# Patient Record
Sex: MALE | Race: OTHER | HISPANIC OR LATINO | Employment: OTHER | URBAN - METROPOLITAN AREA
[De-identification: names, ages, dates, MRNs, and addresses within clinical notes are randomized per-mention and may not be internally consistent; named-entity substitution may affect disease eponyms.]

---

## 2024-12-21 ENCOUNTER — HOSPITAL ENCOUNTER (EMERGENCY)
Facility: HOSPITAL | Age: 74
Discharge: HOME | End: 2024-12-21
Attending: EMERGENCY MEDICINE
Payer: MEDICARE

## 2024-12-21 ENCOUNTER — APPOINTMENT (OUTPATIENT)
Dept: RADIOLOGY | Facility: HOSPITAL | Age: 74
End: 2024-12-21
Payer: MEDICARE

## 2024-12-21 VITALS
SYSTOLIC BLOOD PRESSURE: 139 MMHG | HEIGHT: 65 IN | WEIGHT: 189 LBS | HEART RATE: 80 BPM | TEMPERATURE: 98.2 F | OXYGEN SATURATION: 99 % | DIASTOLIC BLOOD PRESSURE: 78 MMHG | BODY MASS INDEX: 31.49 KG/M2 | RESPIRATION RATE: 18 BRPM

## 2024-12-21 DIAGNOSIS — S89.92XA INJURY OF LEFT KNEE, INITIAL ENCOUNTER: ICD-10-CM

## 2024-12-21 DIAGNOSIS — B02.29 POST HERPETIC NEURALGIA: Primary | ICD-10-CM

## 2024-12-21 PROCEDURE — 2500000005 HC RX 250 GENERAL PHARMACY W/O HCPCS

## 2024-12-21 PROCEDURE — 73552 X-RAY EXAM OF FEMUR 2/>: CPT | Mod: LEFT SIDE | Performed by: RADIOLOGY

## 2024-12-21 PROCEDURE — 99283 EMERGENCY DEPT VISIT LOW MDM: CPT | Performed by: EMERGENCY MEDICINE

## 2024-12-21 PROCEDURE — 99284 EMERGENCY DEPT VISIT MOD MDM: CPT | Performed by: EMERGENCY MEDICINE

## 2024-12-21 PROCEDURE — 2500000001 HC RX 250 WO HCPCS SELF ADMINISTERED DRUGS (ALT 637 FOR MEDICARE OP): Performed by: EMERGENCY MEDICINE

## 2024-12-21 PROCEDURE — 73552 X-RAY EXAM OF FEMUR 2/>: CPT | Mod: LT

## 2024-12-21 RX ORDER — LIDOCAINE 560 MG/1
1 PATCH PERCUTANEOUS; TOPICAL; TRANSDERMAL ONCE
Status: DISCONTINUED | OUTPATIENT
Start: 2024-12-21 | End: 2024-12-21 | Stop reason: HOSPADM

## 2024-12-21 RX ORDER — GABAPENTIN 300 MG/1
300 CAPSULE ORAL 3 TIMES DAILY
Qty: 42 CAPSULE | Refills: 0 | Status: SHIPPED | OUTPATIENT
Start: 2024-12-21 | End: 2025-01-04

## 2024-12-21 RX ORDER — IBUPROFEN 600 MG/1
600 TABLET ORAL ONCE
Status: COMPLETED | OUTPATIENT
Start: 2024-12-21 | End: 2024-12-21

## 2024-12-21 RX ORDER — GABAPENTIN 100 MG/1
200 CAPSULE ORAL ONCE
Status: COMPLETED | OUTPATIENT
Start: 2024-12-21 | End: 2024-12-21

## 2024-12-21 RX ORDER — IBUPROFEN 600 MG/1
600 TABLET ORAL EVERY 6 HOURS PRN
Qty: 21 TABLET | Refills: 0 | Status: SHIPPED | OUTPATIENT
Start: 2024-12-21 | End: 2024-12-28

## 2024-12-21 RX ORDER — ACETAMINOPHEN 325 MG/1
650 TABLET ORAL ONCE
Status: COMPLETED | OUTPATIENT
Start: 2024-12-21 | End: 2024-12-21

## 2024-12-21 RX ADMIN — LIDOCAINE 4% 1 PATCH: 40 PATCH TOPICAL at 17:45

## 2024-12-21 RX ADMIN — IBUPROFEN 600 MG: 600 TABLET, FILM COATED ORAL at 17:45

## 2024-12-21 RX ADMIN — ACETAMINOPHEN 650 MG: 325 TABLET ORAL at 17:45

## 2024-12-21 RX ADMIN — GABAPENTIN 200 MG: 100 CAPSULE ORAL at 17:45

## 2024-12-21 ASSESSMENT — COLUMBIA-SUICIDE SEVERITY RATING SCALE - C-SSRS
1. IN THE PAST MONTH, HAVE YOU WISHED YOU WERE DEAD OR WISHED YOU COULD GO TO SLEEP AND NOT WAKE UP?: NO
2. HAVE YOU ACTUALLY HAD ANY THOUGHTS OF KILLING YOURSELF?: NO
6. HAVE YOU EVER DONE ANYTHING, STARTED TO DO ANYTHING, OR PREPARED TO DO ANYTHING TO END YOUR LIFE?: NO

## 2024-12-21 ASSESSMENT — LIFESTYLE VARIABLES
HAVE YOU EVER FELT YOU SHOULD CUT DOWN ON YOUR DRINKING: NO
EVER HAD A DRINK FIRST THING IN THE MORNING TO STEADY YOUR NERVES TO GET RID OF A HANGOVER: NO
HAVE PEOPLE ANNOYED YOU BY CRITICIZING YOUR DRINKING: NO
EVER FELT BAD OR GUILTY ABOUT YOUR DRINKING: NO
TOTAL SCORE: 0

## 2024-12-21 ASSESSMENT — PAIN SCALES - GENERAL
PAINLEVEL_OUTOF10: 7
PAINLEVEL_OUTOF10: 8

## 2024-12-21 ASSESSMENT — PAIN DESCRIPTION - ORIENTATION: ORIENTATION: LEFT

## 2024-12-21 ASSESSMENT — PAIN DESCRIPTION - PAIN TYPE: TYPE: ACUTE PAIN

## 2024-12-21 ASSESSMENT — PAIN - FUNCTIONAL ASSESSMENT: PAIN_FUNCTIONAL_ASSESSMENT: 0-10

## 2024-12-21 ASSESSMENT — PAIN DESCRIPTION - LOCATION: LOCATION: LEG

## 2024-12-21 NOTE — ED PROVIDER NOTES
EMERGENCY DEPARTMENT ENCOUNTER      Pt Name: Blaise Pope  MRN: [id number]  Birthdate 1950  Date of evaluation: 12/21/2024  Provider: Blanco Marie DO    CHIEF COMPLAINT       Chief Complaint   Patient presents with    Leg Pain     Pt presents to the ED with left thigh pain - pt states it feels as if someone is squeezing his thigh, pt rates pain 8/10. Pt states difficulty ambulating due to the pain x 3 days. Pt had shingles on that thigh 6 weeks ago but has since resolved. Pt states three days ago he fell while ambulating due to the pain - denies hitting his head.     HISTORY OF PRESENT ILLNESS    HPI  73-year-old male presents emergency department chief complaint of leg pain.  Patient claims that he had shingles approximately 6 weeks ago that is since resolved however he has had diffuse pain in the L4-L5 distribution of his left thigh.  He claims having difficulty walking with weakness extending into his knee.  He indicates that he feels as if his knee is giving out.  He has had several falls but this weakness of his left lower extremity.  He has not hit his head he is not on blood thinners.  Nursing Notes were reviewed.    PAST MEDICAL HISTORY   History reviewed. No pertinent past medical history.      SURGICAL HISTORY     History reviewed. No pertinent surgical history.      CURRENT MEDICATIONS       Discharge Medication List as of 12/21/2024  6:20 PM          ALLERGIES     Amoxicillin-pot clavulanate, Oxycodone-acetaminophen, Ranolazine, and Cephalexin    FAMILY HISTORY     No family history on file.       SOCIAL HISTORY       Social History     Socioeconomic History    Marital status:      Social Drivers of Health     Physical Activity: Insufficiently Active (5/20/2024)    [facility]    Exercise Vital Sign     Days of Exercise per Week: 2 days     Minutes of Exercise per Session: 30 min       SCREENINGS                        PHYSICAL EXAM    (up to 7  for level 4, 8 or more for level 5)     ED Triage Vitals [12/21/24 1639]   Temperature Heart Rate Respirations BP   36.8 °C (98.2 °F) 89 18 145/81      Pulse Ox Temp Source Heart Rate Source Patient Position   99 % Temporal Monitor Sitting      BP Location FiO2 (%)     Right arm --       Physical Exam  Constitutional:       Appearance: Normal appearance.   HENT:      Head: Normocephalic and atraumatic.      Nose: Nose normal.      Mouth/Throat:      Pharynx: Oropharynx is clear.   Eyes:      Extraocular Movements: Extraocular movements intact.      Pupils: Pupils are equal, round, and reactive to light.   Cardiovascular:      Rate and Rhythm: Normal rate and regular rhythm.      Pulses: Normal pulses.      Heart sounds: Normal heart sounds.   Pulmonary:      Effort: Pulmonary effort is normal.      Breath sounds: Normal breath sounds.   Abdominal:      General: Abdomen is flat.      Palpations: Abdomen is soft.   Musculoskeletal:      Right upper leg: Normal.      Left upper leg: Tenderness present.      Right knee: Normal.      Comments: Patient's left lower extremity does appear weaker on physical exam.  Sensation is different on the right side compared to the left.  The patient indicates that he is having tightness and pain in the distribution of L4-L5 along his left thigh.   Neurological:      Mental Status: He is alert.          DIAGNOSTIC RESULTS     LABS:  Labs Reviewed - No data to display    All other labs were within normal range or not returned as of this dictation.    Imaging  XR femur left 2+ views   Final Result   Degenerative and remote posttraumatic changes left femur. No acute   findings.        Signed by: Mamadou Ponce 12/21/2024 6:05 PM   Dictation workstation:   [id number]           Procedures  Procedures     EMERGENCY DEPARTMENT COURSE/MDM:   Medical Decision Making  73-year-old male presents emergency department chief complaint of leg pain.  Medical management treatment emergency department  will be to provide patient with a Lidoderm patch and evaluate with an x-ray of the knee.  Patient's x-ray does not show any dislocation or fracture of the knee or femur.  We have increased the patient's Neurontin and recommended follow-up with an orthopedic surgeon to evaluate this patient's knee.  Patient most likely suffering from postherpetic neuralgia.  Patient will be discharged home with strict return precautions.    Diagnoses as of 12/22/24 2325   Post herpetic neuralgia   Injury of left knee, initial encounter        Patient and or family in agreement and understanding of treatment plan.  All questions answered.      I reviewed the case with the attending ED physician. The attending ED physician agrees with the plan. Patient and/or patient´s representative was counseled regarding labs, imaging, likely diagnosis, and plan. All questions were answered.    ED Medications administered this visit:    Medications   gabapentin (Neurontin) capsule 200 mg (200 mg oral Given 12/21/24 1745)   ibuprofen tablet 600 mg (600 mg oral Given 12/21/24 1745)   acetaminophen (Tylenol) tablet 650 mg (650 mg oral Given 12/21/24 1745)       New Prescriptions from this visit:    Discharge Medication List as of 12/21/2024  6:20 PM        START taking these medications    Details   gabapentin (Neurontin) 300 mg capsule Take 1 capsule (300 mg) by mouth 3 times a day for 14 days. Please take 1 tab twice tomorrow and on day 3 take 1 tab every 8 hours and continue 1 tab every 8 hours., Starting Sat 12/21/2024, Until Sat 1/4/2025, Normal      ibuprofen 600 mg tablet Take 1 tablet (600 mg) by mouth every 6 hours if needed for moderate pain (4 - 6) for up to 7 days., Starting Sat 12/21/2024, Until Sat 12/28/2024 at 2359, Normal             Follow-up:  Fei Chen MD  [address] [facility]  [address]  [place] [id number] [phone]    In 1 week      Danette Santoyo MD  [address] [facility]   [facility], [address]  [facility] [address]  [phone]          Dalton Paz, DO  [address]   [facility], [address] [place] [address] [id number] [phone]    In 2 days  Office is open monday through friday from 8:30 am to 4pm.  You can go as either an appointment scheduled or walk in.        Final Impression:   1. Post herpetic neuralgia    2. Injury of left knee, initial encounter          (Please note that portions of this note were completed with a voice recognition program.  Efforts were made to edit the dictations but occasionally words are mis-transcribed.)     Maverick Zheng MD  Resident  12/21/24 1822      The patient was seen by the resident/fellow.  I have personally performed a substantive portion of the encounter.  I have seen and examined the patient; agree with the workup, evaluation, MDM, management and diagnosis.  The care plan has been discussed with the resident; I have reviewed the resident’s note and agree with the documented findings.                                                    Blanco Marie, DO  12/22/24 3374

## 2024-12-21 NOTE — DISCHARGE INSTRUCTIONS
At this time I recommend taking the Neurontin twice tomorrow, and then 3 times on Monday.  Continue 3 times daily for pain control.  Please do not stop this medication all at once, as you can experience withdrawal symptoms.  It must be tapered down.  Please note that you can take the Neurontin with Motrin, and Tylenol for better pain control and also use the Lidoderm patches if they help.    For the knee injury, I suspect there may be a ligamentous strain or tear.  I recommend you follow-up with Dr. Paz our orthopedic doctor provided.  You can call to make an appointment, or if they are open Monday, you can go to their walk in clinic and you will be seen by their knee specialist.       Dr. Santoyo is our pain management doctor you have also been referred to for better control of the nerve pain.

## 2024-12-30 ENCOUNTER — OFFICE VISIT (OUTPATIENT)
Dept: ORTHOPEDIC SURGERY | Facility: CLINIC | Age: 74
End: 2024-12-30
Payer: MEDICARE

## 2024-12-30 ENCOUNTER — HOSPITAL ENCOUNTER (OUTPATIENT)
Dept: RADIOLOGY | Facility: CLINIC | Age: 74
Discharge: HOME | End: 2024-12-30
Payer: MEDICARE

## 2024-12-30 VITALS — BODY MASS INDEX: 31.49 KG/M2 | WEIGHT: 189 LBS | HEIGHT: 65 IN

## 2024-12-30 DIAGNOSIS — M54.50 LUMBAR PAIN: ICD-10-CM

## 2024-12-30 DIAGNOSIS — M25.552 LEFT HIP PAIN: ICD-10-CM

## 2024-12-30 DIAGNOSIS — M54.16 LUMBAR RADICULOPATHY: ICD-10-CM

## 2024-12-30 DIAGNOSIS — M84.352A STRESS FRACTURE OF LEFT HIP: Primary | ICD-10-CM

## 2024-12-30 PROCEDURE — 73502 X-RAY EXAM HIP UNI 2-3 VIEWS: CPT | Mod: LEFT SIDE | Performed by: INTERNAL MEDICINE

## 2024-12-30 PROCEDURE — 99213 OFFICE O/P EST LOW 20 MIN: CPT | Performed by: INTERNAL MEDICINE

## 2024-12-30 PROCEDURE — 73502 X-RAY EXAM HIP UNI 2-3 VIEWS: CPT | Mod: LT

## 2024-12-30 PROCEDURE — 99204 OFFICE O/P NEW MOD 45 MIN: CPT | Performed by: INTERNAL MEDICINE

## 2024-12-30 PROCEDURE — 72100 X-RAY EXAM L-S SPINE 2/3 VWS: CPT | Performed by: INTERNAL MEDICINE

## 2024-12-30 PROCEDURE — 1036F TOBACCO NON-USER: CPT | Performed by: INTERNAL MEDICINE

## 2024-12-30 PROCEDURE — 72100 X-RAY EXAM L-S SPINE 2/3 VWS: CPT

## 2024-12-30 PROCEDURE — 1159F MED LIST DOCD IN RCRD: CPT | Performed by: INTERNAL MEDICINE

## 2024-12-30 RX ORDER — TRAMADOL HYDROCHLORIDE 50 MG/1
50 TABLET ORAL EVERY 12 HOURS PRN
Qty: 14 TABLET | Refills: 0 | Status: SHIPPED | OUTPATIENT
Start: 2024-12-30 | End: 2025-01-06

## 2024-12-30 ASSESSMENT — PATIENT HEALTH QUESTIONNAIRE - PHQ9: 2. FEELING DOWN, DEPRESSED OR HOPELESS: NOT AT ALL

## 2024-12-30 NOTE — PROGRESS NOTES
Acute Injury New Patient Visit    CC:   Chief Complaint   Patient presents with    Left Leg - Pain     Left left, xrays [facility], had shingles a month ago and ever since has thigh and radiating  down thigh       HPI: Blaise is a 74 y.o. male presents today for evaluation for subacute on chronic left leg and low back pain which worsened a month ago. He states that the pain worsened after he had a bout of shingles infection. He was evaluated in the ER, had x-rays taken.         Review of Systems   GENERAL: Negative for malaise, significant weight loss, fever  MUSCULOSKELETAL: See HPI  NEURO:  Negative for numbness / tingling     Past Medical History  History reviewed. No pertinent past medical history.    Medication review  Medication Documentation Review Audit       Reviewed by Inocencia Ponce MA (Medical Assistant) on 24 at 1041      Medication Order Taking? Sig Documenting Provider Last Dose Status   gabapentin (Neurontin) 300 mg capsule [id number]  Take 1 capsule (300 mg) by mouth 3 times a day for 14 days. Please take 1 tab twice tomorrow and on day 3 take 1 tab every 8 hours and continue 1 tab every 8 hours. Blanco Marie DO  Active   ibuprofen 600 mg tablet [id number]  Take 1 tablet (600 mg) by mouth every 6 hours if needed for moderate pain (4 - 6) for up to 7 days. Blanco Marie DO   24 1065                    Allergies  Allergies   Allergen Reactions    Amoxicillin-Pot Clavulanate Diarrhea, GI Upset and Other     Other reaction(s): Other: See Comments   rectal bleeding (Augmentin)    rectal bleeding    Oxycodone-Acetaminophen Hallucinations and Other     Tolerates Vicodin    Tolerates Vicodin   Other reaction(s): Other   Tolerates Vicodin    Ranolazine Unknown     Chest pain, racing heartbeat    Cephalexin Hives     States caused hypertension and palpitations       Social History  Social History     Socioeconomic History    Marital status:      Spouse name: Not on  file    Number of children: Not on file    Years of education: Not on file    Highest education level: Not on file   Occupational History    Not on file   Tobacco Use    Smoking status: Never    Smokeless tobacco: Never   Substance and Sexual Activity    Alcohol use: Not on file    Drug use: Not on file    Sexual activity: Not on file   Other Topics Concern    Not on file   Social History Narrative    Not on file     Social Drivers of Health     Financial Resource Strain: Not on file   Food Insecurity: Not on file   Transportation Needs: Not on file   Physical Activity: Insufficiently Active (5/20/2024)    [facility]    Exercise Vital Sign     Days of Exercise per Week: 2 days     Minutes of Exercise per Session: 30 min   Stress: Not on file   Social Connections: Not on file   Intimate Partner Violence: Not on file   Housing Stability: Not on file       Surgical History  History reviewed. No pertinent surgical history.    Physical Exam:  GENERAL:  Patient is awake, alert, and oriented to person place and time.  Patient appears well nourished and well kept.  Affect Calm, Not Acutely Distressed.  HEENT:  Normocephalic, Atraumatic, EOMI  CARDIOVASCULAR:  Hemodynamically stable.  RESPIRATORY:  Normal respirations with unlabored breathing.  Extremity: Lumbar spine examination shows skin is intact.  Mild midline lumbar tenderness.  He has pain with forward flexion and reverse extension.  Pain with left and right lateral rotation.  Pain with SI joints.  Positive straight leg test on the left leg.  Positive straight leg test on the right leg.  Quadricep strength is 5/ 5 on the right, and 4/5 in the left.    Left hip examination shows skin is intact.  He can flex to left hip at 90 degrees, pain with internal and external rotation.  Pain with abduction.  No pain in the trochanteric bursa.  Distal pulses are palpable.      Diagnostics: X-rays reviewed  XR femur left 2+ views  Narrative:  Interpreted By:  Mamadou Ponce,   STUDY:  XR FEMUR LEFT 2+ VIEWS      INDICATION:  Signs/Symptoms:pain.      COMPARISON:  None      ACCESSION NUMBER(S):  [id number]      ORDERING CLINICIAN:  ALEYDA KIM      FINDINGS:  Mild osteoarthritis left hip. Moderate osteoarthritis left knee.      Small trochanteric spurs.      Long segment of remote posttraumatic heterotopic ossification in the  medial thigh.      No acute findings.      Impression: Degenerative and remote posttraumatic changes left femur. No acute  findings.      Signed by: Mamadou Ponce 12/21/2024 6:05 PM  Dictation workstation:   [id number]      Procedure: None    Assessment:   1.  Left sided lumbar radiculopathy  2.  Lumbar degenerative disc disease, with lumbar stenosis  3.  Left hip pain rule out left hip stress fracture    Plan: Blaise presents today for evaluation for subacute on chronic low back and left leg pain which worsened a month ago. We recommended MRI of the left hip to evaluate for left hip stress fracture, and an MRI of the lumbar spine to evaluate for lumbar stenosis. He will follow-up with the spine team after the MRI of the lumbar spine.     No orders of the defined types were placed in this encounter.     At the conclusion of the visit there were no further questions by the patient/family regarding their plan of care.  Patient was instructed to call or return with any issues, questions, or concerns regarding their injury and/or treatment plan described above.     12/30/24 at 10:50 AM - Jose Batres MD  Scribe Attestation  By signing my name below, I Wicho Black, Scribe   attest that this documentation has been prepared under the direction and in the presence of Jose Batres MD.    Office: [phone]    This note was prepared using voice recognition software.  The details of this note are correct and have been reviewed, and corrected to the best of my ability.  Some grammatical errors may persist related to  the Dragon software.

## 2025-01-02 ENCOUNTER — TELEPHONE (OUTPATIENT)
Dept: ORTHOPEDIC SURGERY | Facility: CLINIC | Age: 75
End: 2025-01-02
Payer: MEDICARE

## 2025-01-02 DIAGNOSIS — F40.240 CLAUSTROPHOBIA: ICD-10-CM

## 2025-01-02 RX ORDER — DIAZEPAM 5 MG/1
TABLET ORAL
Qty: 2 TABLET | Refills: 0 | Status: SHIPPED | OUTPATIENT
Start: 2025-01-02

## 2025-01-02 NOTE — TELEPHONE ENCOUNTER
Patient's wife called, states patient will need valium for his upcoming MRI as the patient is claustrophobic.  Pharmacy verified ([facility])

## 2025-01-07 ENCOUNTER — APPOINTMENT (OUTPATIENT)
Dept: PAIN MEDICINE | Facility: CLINIC | Age: 75
End: 2025-01-07
Payer: MEDICARE

## 2025-01-09 ENCOUNTER — APPOINTMENT (OUTPATIENT)
Dept: ORTHOPEDIC SURGERY | Facility: CLINIC | Age: 75
End: 2025-01-09
Payer: MEDICARE

## 2025-01-20 ENCOUNTER — ANCILLARY PROCEDURE (OUTPATIENT)
Facility: HOSPITAL | Age: 75
End: 2025-01-20
Payer: MEDICARE

## 2025-01-20 DIAGNOSIS — M84.352A STRESS FRACTURE OF LEFT HIP: ICD-10-CM

## 2025-01-20 DIAGNOSIS — M54.16 LUMBAR RADICULOPATHY: ICD-10-CM

## 2025-01-20 PROCEDURE — 73721 MRI JNT OF LWR EXTRE W/O DYE: CPT | Mod: LT

## 2025-01-20 PROCEDURE — 72148 MRI LUMBAR SPINE W/O DYE: CPT | Performed by: RADIOLOGY

## 2025-01-20 PROCEDURE — 73721 MRI JNT OF LWR EXTRE W/O DYE: CPT | Mod: LEFT SIDE | Performed by: RADIOLOGY

## 2025-01-20 PROCEDURE — 72148 MRI LUMBAR SPINE W/O DYE: CPT

## 2025-01-29 ENCOUNTER — OFFICE VISIT (OUTPATIENT)
Dept: ORTHOPEDIC SURGERY | Facility: CLINIC | Age: 75
End: 2025-01-29
Payer: MEDICARE

## 2025-01-29 DIAGNOSIS — M54.16 LUMBAR RADICULOPATHY: Primary | ICD-10-CM

## 2025-01-29 PROCEDURE — 99214 OFFICE O/P EST MOD 30 MIN: CPT | Performed by: PHYSICIAN ASSISTANT

## 2025-01-29 PROCEDURE — 1036F TOBACCO NON-USER: CPT | Performed by: PHYSICIAN ASSISTANT

## 2025-01-29 PROCEDURE — 99204 OFFICE O/P NEW MOD 45 MIN: CPT | Performed by: PHYSICIAN ASSISTANT

## 2025-01-29 PROCEDURE — 1159F MED LIST DOCD IN RCRD: CPT | Performed by: PHYSICIAN ASSISTANT

## 2025-01-29 RX ORDER — PREDNISONE 10 MG/1
TABLET ORAL
Qty: 30 TABLET | Refills: 0 | Status: SHIPPED | OUTPATIENT
Start: 2025-01-29

## 2025-01-29 RX ORDER — CYCLOBENZAPRINE HCL 10 MG
10 TABLET ORAL 3 TIMES DAILY PRN
Qty: 30 TABLET | Refills: 0 | Status: SHIPPED | OUTPATIENT
Start: 2025-01-29 | End: 2025-02-08

## 2025-01-29 NOTE — PROGRESS NOTES
New patient to us new to the practice comes the office with his wife who is also historian on patient's pain symptoms treatment and how he responded.  Patient complains of not so much back pain.  But left leg pain that goes from the groin and inner thigh down to the knee leg feels super weak he has to lift it he feels like he is a fall risk he is get burning numbness and tingling no bowel or bladder complaints no saddle anesthesia no gait or balance changes.  He was feeling symptoms like this maybe for a couple years.  But worse recently he went to the emergency room was treated and released.  Referred to us.  Patient denies any spine surgery denies trauma accidents or falls to cause it denies any fevers chills nausea vomiting or night sweats.    We read the emergency room note agree with her treatment referral to us for left leg pain.  We looked at his recent blood work checked a hemoglobin A1c 6.0 we looked at primary doctors note check the medication with see if he is on a statin I do not see that he is he is taking gabapentin and states that that made him feel dizzy and weak.    Physical exam: Well-nourished, well kept.  No lymphangitis or lymphadenopathy in the examined extremities.  Good perfusion to the extremities ×4.  Radial and dorsalis pedis pulses 2+.  Capillary refill to all 4 digits brisk.  No distal edema x 4.  Patient ambulating with antalgic gait using a walker for assistan.ce full range of motion cervical spine observation no major abnormalities.  Good range of motion lumbar spine nontender no instability.  Patient moving upper extremities without any difficulty motor strength intact patient has severe weakness in left hip flexor left thigh which is about 3/5 left thigh severely atrophied compared to the right thigh.  Dorsi and plantarflexion 5 out of 5.  Comparison examination of the contralateral side is normal with regards to palpation, range of motion, strength and stability.  No redness,  abrasions, or lesions on all 4 extremities, head and neck, or trunk.  Patient neurologically intact    Patient had prior x-rays which were reviewed and reports reviewed as well showing a mild spondylolisthesis at L4-5 severe degenerative disc at L3-4 no fractures dislocations or bony lytic lesions.  Patient had an MRI done which were reviewed and report was reviewed as well showing severe degenerative disc at L3-4.  Patient has a far lateral disc herniation at L3-4 causing foraminal stenosis abutting that nerve.  L4-5 shows some moderate central stenosis.  With fluid noted in the facet joints bilaterally.    Assessment: This a patient with left leg pain burning burning numbness and tingling weakness in the thigh atrophy of that left thigh.  I think is due to that L3-4 level.  We talked about treatment options continue conservative care physical therapy acupuncture chiropractic pain management injections talked about surgery which would be interbody fusions L3-4 L4-5 with instrumentation posterior lateral fusion.  He wants to go the conservative route.    Plan: Will send some steroids and muscle relaxants for current symptoms will get him in physical therapy at [facility] in [facility] and get him to pain management for an L3-4 left epidural injection patient understands that there may be a delay in surgery if he gets an injection due to risk of infection

## 2025-02-17 ENCOUNTER — EVALUATION (OUTPATIENT)
Dept: PHYSICAL THERAPY | Facility: CLINIC | Age: 75
End: 2025-02-17
Payer: MEDICARE

## 2025-02-17 DIAGNOSIS — M54.16 LUMBAR RADICULOPATHY: ICD-10-CM

## 2025-02-17 DIAGNOSIS — M79.2 NERVE PAIN: Primary | ICD-10-CM

## 2025-02-17 PROCEDURE — 97162 PT EVAL MOD COMPLEX 30 MIN: CPT | Mod: GP

## 2025-02-17 PROCEDURE — 97112 NEUROMUSCULAR REEDUCATION: CPT | Mod: GP

## 2025-02-17 NOTE — PROGRESS NOTES
Physical Therapy    Physical Therapy Evaluation    Patient Name: Blaise Pope  MRN: [id number]  Today's Date: 2025   confirmed verbally by patient.    Time Entry:   Time Calculation  Start Time: 1610  Stop Time: 1655  Time Calculation (min): 45 min  PT Evaluation Time Entry  PT Evaluation (Moderate) Time Entry: 30  PT Therapeutic Procedures Time Entry  Neuromuscular Re-Education Time Entry: 15       Reason for Visit  Referred by: Hector De Anda PA-C   Referral DX: Lumbar radiculopathy [M54.16]     Insurance:  Visit: #1  Authorized: to be determined  Payor/Plan: Medicare A/B // [email or web address]l Plan N       Current Problem  1. Nerve pain  Follow Up In Physical Therapy      2. Lumbar radiculopathy  Referral to Physical Therapy    Follow Up In Physical Therapy          Subjective   Date of Onset: 2024   Chief Complaint: Left sided thigh pain with radicular symptoms     Patient presents to physical therapy with complaints of left anterior and posterior thigh pain after experiencing a bout of shingles at the end of  (November). Patient notes that the pain feels like a sunburn sensation wrapping around the the entire thigh. Patient denies that before having shingles he did not experience any low back and hip related issues, other than when he is standing for prolonged periods of time. Patient's current symptoms are not consistent with any impairments or symptoms that patient experienced in the past or prior to shingles diagnosis. Patient notes that he has lost roughly 24 pounds since the shingles began.     With all of the medication and treatments that the patient has had during this time he has not found any relief of symptoms or improvements in sleeping (due to increased pain and discomfort). Surgical interventions were discussed as an option but patient has now transitioned to a consideration for a nerve block in the upcoming weeks and a bout of physical therapy before  considering surgery again as an additional intervention.      Patient admits 3 falls since bout with shingles due to difficulties with upright standing and ambulation as a result of the pain and weakness to the anterior thigh musculature. Patient reports that in weight bearing, he has had to maintain the LLE in hyperextended state, limiting knee flexion to avoid further buckling episodes which usually lend to falls.       RED FLAGS:  Saddle anesthesia? = no  Changes in bowel/bladder? = no  Recent weight loss not purposeful? = yes (24 pounds since shingles diagnosis)   Bilateral numbness/tingling? = no       Pain   At rest = 5/10   Worst = 10/10 (at night when resting the R leg on top of the left leg)   Description   Sunburn sensation with palpation/touch to the area   Tightness/compression wrapping around the entire left thigh         Recent Imaging:  MR of left hip (1/20/25)  Mild to moderate osteoarthritic changes of both hips.  Discogenic degenerative change in lower lumbar spine.  Edema and ill-defined fluid within the left iliopsoas above the level of the left hip and within the left obturator externus and adductor muscles. These findings are compatible with muscle strain.  Minimal left iliopsoas peritendinitis and tendinosis. There is no tendon tear.  No acute osseous abnormality.    MR of lumbar spine (1/20/25)  * Lumbar spondylosis as described  *Minor lumbar canal stenosis at L4/L5  *No evidence of focal disc herniation      Patient stated goals for treatment:  Patient wishes to be able to return to functional ADLs, self-care, and improved walking mechanics and capacity without pain     Reviewed medical screening history form with patient: Yes,        Barriers Impacting Care:  Psychological   Patient has experienced prolonged dysfunction as a result of previous shingles diagnosis which has made every day life more challenging and discouraging       Precautions:  History of cancer   Shingles diagnosis with  observation of rashes to the left anteromedial thigh (patient reports these locations are very sensitive to tough and reproduce pain)           OBJECTIVE  Functional Assessment  5x STS = 14 seconds     Gait Assessment   Single point cane with right hand   Lack of sagittal plane pelvic flex/ext with increased compensatory rotation of the pelvis during swing phase when transitioning from terminal stance   Antalgic gait pattern         Range of Motion   Hip ROM (L/R)  Flexion:   L = WNL P with soft end-feel   R = WNL P with soft end-feel (patient notes increased tension to anterior thigh)  External Rotation:   L = WNL P with muscular end-feel   R = 30 degrees P with empty end-feel (pain to medial knee)   Internal rotation:   L = WNL P with firm end-feel   R = WNL P with firm end-feel         Myotomes   L1-2 (Hip Flexion) =   (L) =  3+/5   (R) =  4/5   L3-4 (Knee Extension) =   (L) =  3+/5  (R) =  4+/5  L4 (Ankle DF) =   (L) =  4/5  (R) =  4+/5  L5 (Great toe extension) =   (L) =  4/5  (R) =  4/5  S1 (Ankle PF) =   (L) =  4/5  (R) =  4+/5  S2 (Knee Flexion) =   (L) =  4/5  (R) = 4/5    DTR (L/R)  Patellar L4: 0+/2+  Achilles S1: 1+/2+    Dermatomes   Diminished and painful to L1-4 on the anterior thigh x  No dysfunction or abnormality distal to the knee       Special Tests  (-) SLR bilaterally     (-) FADIR, SCOUR, GIUSEPPE (bilat)       Joint mobility testing (normal unless otherwise noted below)  L1 = 1+   L2 = 1+  L3 = 1+  L4 = 1+  L5 = 1+    Palpation   TTP (LLE) = sartorius gracilis, rectus femoris, VMO, vastus lateralis  No TTP = similar structures on the RLE       Outcome Measures:  SHERIDAN = 45% disability (45 total score)   PHQ-9 = 9     Assessment  PT Diagnosis: Patient presents to physical therapy with signs and symptoms of altered sensation and muscle weakness to L1 through L3 lumbar vertebrae impacting the anterior thigh. Based on examination and discussion with patient, patient symptoms most likely secondary  results of prior shingles condition four months earlier (postherpetic pain). Patient is introduced in session to active range of motion in open chain as well as close chain stability of the involved limb. Desensitization training discussed and introduced in session to patient on the left anterior thigh, with reduction in overall pain reported by patient post. End of session patient is able to ambulate with reduction in anterior thigh (LLE) pain compared to the start of the session, encouraging the patient of potential progress to be made with physical therapy. This is a moderate complexity evaluation with the involvement of multiple systems of the body, including the musculoskeletal, nervous system and potentially the cardiovascular system as well. Patient will likely benefit from physical therapy services to further improve functional capacity and tolerance to dynamic closed and open chain movements required for ADLs, self-care, and community navigation.      Plan  Therapeutic exercises to improve thoracolumbar ROM of and strength of proximal hip and core musculature; neuromuscular re-education to promote proper engagement of proximal hip and core musculature in open and closed chain movements; manual therapy for joint mobility and soft tissue tightness; therapeutic activity to promote return to prior level of function; dry needling; aquatic therapy; cold therapy; Biofeedback; gait training; Electrical stimulation; Patient plan will consist of 2 days/week for 12 weeks.    Next Visit Plan:  Assess response to initial interventions in session and HEP   Improve closed and open chain quadriceps muscle activation   Desensitization training to the left anterior thigh with varying items/surfaces   Single leg strength and neuro re-education beginning in sitting and progressing to standing         TREATMENT:  Patient Education   Patient and caregiver educated on anatomy of the lumbar spine as it pertains to motor and  sensory nerves  Patient and caregiver educated on the roles and responsibilities of motor and sensory nerves as well as presentations of dysfunction to the nervous system.  Patient and caregiver educated on nervous system related pain, following shingles, diagnosis and differentiating from general lumbar radiculopathy symptoms.  Patient educated on current plan of care and treatment.    Neuromuscular Re-education = 1 unit  Towel Desensitization  - 2-3 x daily - 7 x weekly - 2 sets - 1-2 minutes  hold  Seated Knee Extension AROM  - 2-3 x daily - 7 x weekly - 2 sets - 30 seconds hold  Standing March with Counter Support  - 2-3 x daily - 7 x weekly - 2 sets - 30 seconds hold  Weight shifting - will utilize in follow up session with more therapist assistance to reduce buckling   Bilateral Body weight (BW) squats  - will utilize in follow up session with more therapist assistance to reduce buckling          Goals:  Patient will be independent with HEP. (Short-term)  Patient will improve in SHERIDAN outcome measure <35% disability in six weeks. (Short-term)  Patient will improve in SHERIDAN outcome measure to <25% disability in 12 weeks. (Long-term)  Patient will demonstrate the ability to perform sit to stand transfers with the use of at least 50% of body weight to the involved lower extremity in six weeks (short-term).   Patient will demonstrate the ability to perform a full sit to stand transfer with 100% weight-bearing on bilateral limbs in 12 weeks without pain. (Long-term)  Patient will be able to ambulate 50 feet without the use of a single point mancilla and less than a three out of 10 pain scale in six weeks (short-term).   Patient will be able to ambulate 150 feet without the use of a single point cane and no pain in 12 weeks. (Long-term)  Patient will demonstrate symmetrical strength in lower extremity myotome screening in 12 weeks. (Long-term)        Brayan Strong PT, DPT

## 2025-02-19 ENCOUNTER — TREATMENT (OUTPATIENT)
Dept: PHYSICAL THERAPY | Facility: CLINIC | Age: 75
End: 2025-02-19
Payer: MEDICARE

## 2025-02-19 ENCOUNTER — APPOINTMENT (OUTPATIENT)
Dept: PHYSICAL THERAPY | Facility: CLINIC | Age: 75
End: 2025-02-19
Payer: MEDICARE

## 2025-02-19 DIAGNOSIS — M79.2 NERVE PAIN: ICD-10-CM

## 2025-02-19 DIAGNOSIS — M54.16 LUMBAR RADICULOPATHY: ICD-10-CM

## 2025-02-19 PROCEDURE — 97112 NEUROMUSCULAR REEDUCATION: CPT | Mod: GP

## 2025-02-19 NOTE — PROGRESS NOTES
"Physical Therapy    Physical Therapy Treatment    Patient Name: Blaise Pope  MRN: [id number]  Today's Date: 2/19/2025  Time Calculation  Start Time: 1745  Stop Time: 1830  Time Calculation (min): 45 min        Insurance:  Visit: #2  Authorized: 20  Certification:  2/17/2025 - 2/17/2026   Payor: MEDICARE / Plan: MEDICARE PART A AND B / Product Type: *No Product type* /       Subjective:  Patient reports to physical therapy with immediate improvements in anterior thigh sensitivity since beginning desensitization training at the initial evaluation, reducing pain from a 5-6/10 to 2/10. Patient has been compliant with interventions provided as part of HEP since and does not have any worsening of symptoms. Patient denies any falls or stumbles. Ambulation in clinic without single point cane or any additional assistive device that has been utilized over the last several weeks due to limitations in patient's strength and function of the left thigh.         Current pain: 2-3/10 sensory pain to the left anterior thigh         Objective:  Knee ROM   Flexion   L = 121 A, 131 P  R = 121 A, 123 P with empty end-feel     Straight leg raise  Patient unable to perform independently, requiring external assistance to perform   Patient able to perform and maintain quad set for 60 consecutive seconds without reported difficulty   Observed increased muscle twitching with prolonged muscle contraction           Treatment:  Neuromuscular Re-education = 3 units  Desensitization training = 1 minute with each to the anterior thigh   Towel, block, goniometer, tissue   SLR = 2x5x3\" eccentric (with strap assistance)  LAQ = 2x60 seconds with strap assistance   Slider lateral and reverse lunge = 3x8 each direction   Therapist contact guard and knee blocking throughout   Forward eccentric rocking onto 6 inch step with unilateral UE support = 10x       HEP: #2 (Access Code: [email or web address])  - Towel Desensitization  - 2-3 x daily - 7 x weekly - 2 sets - " 1-2 minutes  hold  - Supine Active Straight Leg Raise  - 1-2 x daily - 7 x weekly - 2 sets - 5 reps - 3-5 eccentric hold  - Seated Knee Extension AROM  - 2-3 x daily - 7 x weekly - 2 sets - 60 seconds hold  - Standing Knee Flexion Stretch on Step  - 1-2 x daily - 7 x weekly - 1 sets - 10 reps      OP Education:   Discussion regarding progressing interventions independently at home between sessions when interventions become easier to perform           Diagnosis:  1. Lumbar radiculopathy    2. Nerve pain          Assessment:   Pt demonstrates tolerance to interventions in session beginning with desensitization training using varied surfaces/items to the anteromedial thigh. Patient reports that since beginning this intervention at the initial evaluation it has helped to reduce pain and in session has reduced pain with each item. Patient is challenged with open and closed chain quadriceps interventions to further induce neuromuscular re-education to the quadriceps, requiring contact guard assistance with lateral-reverse lunges and strap assistance with SLR. Patient's motivation to improve and compliance to interventions will continue to enable positive improvement with physical therapy interventions. Patient will continue to benefit from physical therapy services in order to return to prior level of function without compensatory motions during active movements.           Plan:  Assess response to initial interventions in session and HEP   Improve closed and open chain quadriceps muscle activation   Desensitization training to the left anterior thigh with varying items/surfaces   Single leg strength and neuro re-education beginning in sitting and progressing to standing        Goals:  Patient will be independent with HEP. (Short-term)  MET  Patient will improve in SHERIDAN outcome measure <35% disability in six weeks. (Short-term)  5% MET   Patient will improve in SHERDIAN outcome measure to <25% disability in 12 weeks.  (Long-term)  Patient will demonstrate the ability to perform sit to stand transfers with the use of at least 50% of body weight to the involved lower extremity in six weeks (short-term).   5% MET   Patient will demonstrate the ability to perform a full sit to stand transfer with 100% weight-bearing on bilateral limbs in 12 weeks without pain. (Long-term)  Patient will be able to ambulate 50 feet without the use of a single point mancilla and less than a three out of 10 pain scale in six weeks (short-term).   25% MET   Patient will be able to ambulate 150 feet without the use of a single point cane and no pain in 12 weeks. (Long-term)  15% MET   Patient will demonstrate symmetrical strength in lower extremity myotome screening in 12 weeks. (Long-term)  5% MET             Brayan Strong, PT

## 2025-02-27 ENCOUNTER — TREATMENT (OUTPATIENT)
Dept: PHYSICAL THERAPY | Facility: CLINIC | Age: 75
End: 2025-02-27
Payer: MEDICARE

## 2025-02-27 DIAGNOSIS — M54.16 LUMBAR RADICULOPATHY: ICD-10-CM

## 2025-02-27 DIAGNOSIS — M79.2 NERVE PAIN: ICD-10-CM

## 2025-02-27 PROCEDURE — 97112 NEUROMUSCULAR REEDUCATION: CPT | Mod: GP

## 2025-02-27 NOTE — PROGRESS NOTES
"Physical Therapy    Physical Therapy Treatment    Patient Name: Blaise Pope  MRN: [id number]  Today's Date: 2/27/2025  Time Calculation  Start Time: 1347  Stop Time: 1435  Time Calculation (min): 48 min        Insurance:  Visit: #3  Authorized: 20  Certification:  2/17/2025 - 2/17/2026   Payor: MEDICARE / Plan: MEDICARE PART A AND B / Product Type: *No Product type* /       Subjective:  Patient reports to physical therapy with continued wrapping sensation to the left anterior thigh, feeling as though it is being compressed continuously. He is unsure if the sensation is from the calcium deposit remaining in the thigh or additional side effects from the shingles.     Patient admits that after the last session he experienced a fall outside slipping on ice when walking into a store, reaching the arms behind him to catch himself suffering increased discomfort to the left shoulder.         Current pain: 3-4/10 sensory pain to the left anterior thigh         Objective:  Straight leg raise  Patient unable to perform independently, requiring external assistance to perform   Patient able to perform and maintain quad set for 60 consecutive seconds without reported difficulty   Observed increased muscle twitching with prolonged muscle contraction           Treatment:  Therapeutic Exercise = 0 units  Recumbent bike = 5 minutes     Neuromuscular Re-education = 3 units  TENS (level 20; 5 sec on/ 10 off ; rate 150)  Quad set = 3x20\" holds   NMES (level 50, no on/off, rate 10)   Quad set = 7x30\" holds with 10\" off  SLR = 2x5   LAQ = 2x5x5\" holds compound set of 5 AAROM   Slider lateral and reverse lunge = 3x8 each direction   Therapist contact guard and knee blocking throughout       HEP: #2 (Access Code: [email or web address])  - Towel Desensitization  - 2-3 x daily - 7 x weekly - 2 sets - 1-2 minutes  hold  - Supine Active Straight Leg Raise  - 1-2 x daily - 7 x weekly - 2 sets - 5 reps - 3-5 eccentric hold  - Seated Knee Extension AROM  - " 2-3 x daily - 7 x weekly - 2 sets - 60 seconds hold  - Standing Knee Flexion Stretch on Step  - 1-2 x daily - 7 x weekly - 1 sets - 10 reps      OP Education:   Discussion regarding progressing interventions independently at home between sessions when interventions become easier to perform           Diagnosis:  1. Lumbar radiculopathy    2. Nerve pain          Assessment:   Pt demonstrates tolerance to interventions in session beginning with use of TENS unit to continue addressing increased sensitivity to the anterior thigh, which has positively improved to this point with desensitization training. Patient tolerated well the use of TENS before transitioning to use of NMES to the VMO and distal rectus femoris with active movements aimed at improving neuromuscular re-education of the quadriceps in open and closed chain positions. Observed with standing weight shifts is the increased confidence and weight bearing when shifting to the left side with each performance. Patient will continue current HEP between therapy sessions without performing closed chain movements due to increased risk of falls. Patient will continue to benefit from physical therapy services in order to return to prior level of function without compensatory motions during active movements.          Plan:  Assess response to initial interventions in session and HEP   Improve closed and open chain quadriceps muscle activation   Desensitization training to the left anterior thigh with varying items/surfaces   Single leg strength and neuro re-education beginning in sitting and progressing to standing        Goals:  Patient will be independent with HEP. (Short-term)  MET  Patient will improve in SHERIDAN outcome measure <35% disability in six weeks. (Short-term)  5% MET   Patient will improve in SHERIDAN outcome measure to <25% disability in 12 weeks. (Long-term)  Patient will demonstrate the ability to perform sit to stand transfers with the use of at least 50%  of body weight to the involved lower extremity in six weeks (short-term).   10% MET   Patient will demonstrate the ability to perform a full sit to stand transfer with 100% weight-bearing on bilateral limbs in 12 weeks without pain. (Long-term)  5% MET   Patient will be able to ambulate 50 feet without the use of a single point mancilla and less than a  3/10 pain scale in six weeks (short-term).   25% MET   Patient will be able to ambulate 150 feet without the use of a single point cane and no pain in 12 weeks. (Long-term)  15% MET   Patient will demonstrate symmetrical strength in lower extremity myotome screening in 12 weeks. (Long-term)  5% MET             Brayan Strong, PT

## 2025-03-04 ENCOUNTER — APPOINTMENT (OUTPATIENT)
Dept: PHYSICAL THERAPY | Facility: CLINIC | Age: 75
End: 2025-03-04
Payer: MEDICARE

## 2025-03-11 ENCOUNTER — TREATMENT (OUTPATIENT)
Dept: PHYSICAL THERAPY | Facility: CLINIC | Age: 75
End: 2025-03-11
Payer: MEDICARE

## 2025-03-11 DIAGNOSIS — M79.2 NERVE PAIN: ICD-10-CM

## 2025-03-11 DIAGNOSIS — M54.16 LUMBAR RADICULOPATHY: ICD-10-CM

## 2025-03-11 PROCEDURE — 97140 MANUAL THERAPY 1/> REGIONS: CPT | Mod: GP

## 2025-03-11 PROCEDURE — 97112 NEUROMUSCULAR REEDUCATION: CPT | Mod: GP

## 2025-03-11 NOTE — PROGRESS NOTES
"Physical Therapy    Physical Therapy Treatment    Patient Name: Blaise Pope  MRN: [id number]  Today's Date: 3/11/2025  Time Calculation  Start Time: 0936  Stop Time: 1017  Time Calculation (min): 41 min        Insurance:  Visit: #4  Authorized: 20  Certification:  2/17/2025 - 2/17/2026   Payor: MEDICARE / Plan: MEDICARE PART A AND B / Product Type: *No Product type* /       Subjective:  Patient reports to physical therapy with continued wrapping sensation to the left anterior thigh, feeling as though it is being compressed continuously. He continues to admit that there has been positive progress since beginning physical therapy but overall has not improved at the rate he would like to be.     Denies any falls since previous session.         Current pain: 3-4/10 sensory pain to the left anterior thigh         Objective:  Lateral step up   Patient has noticeable buckling sensation occur when descending step but is able to control with bilateral UE to avoid stumble or fall    TTP = rectus femoris, VL, ITB          Treatment:  Therapeutic Exercise = 0 units  Recumbent bike = 5 minutes   Discussion regarding HEP performance and parameters     Neuromuscular Re-education = 2 units  NMES (level 83, no on/off, rate 10)   SL leg press = 4x45\" holds   Eccentric forward rocking on step = 2x5x5\" eccentric   Lateral step up = 2x10     Manual Therapy = 1 unit  IASTM = rectus femoris, ITB, VL remaining in cliff test position throughout       HEP: #2 (Access Code: [email or web address])  - Towel Desensitization  - 2-3 x daily - 7 x weekly - 2 sets - 1-2 minutes  hold  - Supine Active Straight Leg Raise  - 1-2 x daily - 7 x weekly - 2 sets - 5 reps - 3-5 eccentric hold  - Seated Knee Extension AROM  - 2-3 x daily - 7 x weekly - 2 sets - 60 seconds hold  - Standing Knee Flexion Stretch on Step  - 1-2 x daily - 7 x weekly - 1 sets - 10 reps      OP Education:   Discussion regarding progressing interventions independently at home between " sessions when interventions become easier to perform           Diagnosis:  1. Lumbar radiculopathy    2. Nerve pain          Assessment:   Pt demonstrates tolerance to interventions in session, using NMES to the quads while introducing isometric single leg leg press to further induce quadriceps neuromuscular re-education in closed chain position. He struggles to maintain constant intensity throughout but can complete all sets and duration in session without worsening of symptoms. Manual therapy was utilized in session to address soft tissue limitations in extensibility and trigger points most notably to the rectus femoris and vastus lateralis. Patient's lack of noticeable improvement in patient's function and symptoms leads to discouragement, but patient education regarding improvement in functional capacity and confidence with walking has been noticeable since beginning physical therapy.         Plan:  Assess response to initial interventions in session and HEP   Improve closed and open chain quadriceps muscle activation   Desensitization training to the left anterior thigh with varying items/surfaces   Single leg strength and neuro re-education beginning in sitting and progressing to standing        Goals:  Patient will be independent with HEP. (Short-term)  MET  Patient will improve in SHERIDAN outcome measure <35% disability in six weeks. (Short-term)  10% MET   Patient will improve in SHERIDAN outcome measure to <25% disability in 12 weeks. (Long-term)  Patient will demonstrate the ability to perform sit to stand transfers with the use of at least 50% of body weight to the involved lower extremity in six weeks (short-term).   15% MET   Patient will demonstrate the ability to perform a full sit to stand transfer with 100% weight-bearing on bilateral limbs in 12 weeks without pain. (Long-term)  10% MET   Patient will be able to ambulate 50 feet without the use of a single point mancilla and less than a  3/10 pain scale in  six weeks (short-term).   30% MET   Patient will be able to ambulate 150 feet without the use of a single point cane and no pain in 12 weeks. (Long-term)  15% MET   Patient will demonstrate symmetrical strength in lower extremity myotome screening in 12 weeks. (Long-term)  10% MET             Brayan Strong, PT

## 2025-03-12 ENCOUNTER — HOSPITAL ENCOUNTER (OUTPATIENT)
Dept: NEUROLOGY | Facility: HOSPITAL | Age: 75
Discharge: HOME | End: 2025-03-12
Payer: MEDICARE

## 2025-03-12 DIAGNOSIS — M47.816 SPONDYLOSIS WITHOUT MYELOPATHY OR RADICULOPATHY, LUMBAR REGION: ICD-10-CM

## 2025-03-12 DIAGNOSIS — M54.16 RADICULOPATHY, LUMBAR REGION: ICD-10-CM

## 2025-03-12 DIAGNOSIS — B02.22 POSTHERPETIC TRIGEMINAL NEURALGIA: ICD-10-CM

## 2025-03-12 PROCEDURE — 95909 NRV CNDJ TST 5-6 STUDIES: CPT | Performed by: STUDENT IN AN ORGANIZED HEALTH CARE EDUCATION/TRAINING PROGRAM

## 2025-03-12 PROCEDURE — 95886 MUSC TEST DONE W/N TEST COMP: CPT | Performed by: STUDENT IN AN ORGANIZED HEALTH CARE EDUCATION/TRAINING PROGRAM

## 2025-03-18 ENCOUNTER — TREATMENT (OUTPATIENT)
Dept: PHYSICAL THERAPY | Facility: CLINIC | Age: 75
End: 2025-03-18
Payer: MEDICARE

## 2025-03-18 DIAGNOSIS — M79.2 NERVE PAIN: ICD-10-CM

## 2025-03-18 DIAGNOSIS — M54.16 LUMBAR RADICULOPATHY: ICD-10-CM

## 2025-03-18 PROCEDURE — 97112 NEUROMUSCULAR REEDUCATION: CPT | Mod: GP

## 2025-03-18 PROCEDURE — 97140 MANUAL THERAPY 1/> REGIONS: CPT | Mod: GP

## 2025-03-18 NOTE — PROGRESS NOTES
Patient:   AILIN PAUL            MRN: [id number]            FIN: [id number]               Age:   43 years     Sex:  Male     :  1977   Associated Diagnoses:   Type 2 diabetes, HbA1c goal < 7%; Obesity; HTN (hypertension)   Author:   Maxine Beard      Visit Information   Diabetes Self Management Education    Accompanied by:  Spouse.    Source of history:  Self, Spouse.    Referral source:  Markos Dodge MD.       Chief Complaint   DM Type II, Obesity       History of Present Illness   Pt here with new surprising dx of DM Type II.  Pt was seen by MD with c/o ED.  Pt does not have an annual physical and MD completed lab work with initial glucose of 315 and follow up a1c 10.4%.  Pt without typical hyperglycemic symptoms.     Discussed nutrition, diabetes, and lifestyle management with pt.  Over the past year+ pt has been working on weight loss, healthier eating and activity.  Pt's highest weight was 383#; congratulated pt on his significant weight loss.    Nutrition:  mid morning yogurt, afternoon a Lean Cuisine or leftovers (portion controlled), afternoon 1-2 rice cakes; evening protein and vegetables and a starch only ~ 1/2 of the time   Fluids: zero soda or water, rare milk   Physical Activity:  will discuss during follow up consults    Stress:   low/ mod  Sleep: good  Support: wife  BG Testing: education today, BG in office 164mg/dL (Will not increase medication at this time but patient to call in 1 to 2 weeks to review glucose readings for further evaluation)  DM related medication: metformin ER 500mg i tab daily - taking as directed  Routine diabetes care:  will discuss during follow up consults    Dilated Retinal Eye Exam:    Skin:   Dental:   Feet:   Immunizations:   Hgb A1c: 10.4% = 252 mg/dL estimated average glucose       Health Status   Allergies:    Allergic Reactions (Selected)  Severity Not Documented  No known allergies (No reactions were documented)   Medications:  (Selected)  "Physical Therapy    Physical Therapy Treatment    Patient Name: Blaise Pope  MRN: [id number]  Today's Date: 3/18/2025  Time Calculation  Start Time: 0942  Stop Time: 1028  Time Calculation (min): 46 min        Insurance:  Visit: #5  Authorized: 20  Certification:  2/17/2025 - 2/17/2026   Payor: MEDICARE / Plan: MEDICARE PART A AND B / Product Type: *No Product type* /       Subjective:  Patient reports to physical therapy with continued wrapping sensation to the left anterior thigh, feeling as though it is being compressed continuously. He recently had EMG study completed which demonstrates denervation of nerve roots described further below. Patient continues to use TENS home unit but denies any positive improvement in paresthesia symptoms. The left knee has been having burning sensation to the front of the knee since previous fall onto the knee.   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 With patient's known clinical history, this finding may be seen after herpes zoster infection. There is no electrodiagostic of a large fiber polyneuropathy or mononeuropathy affecting the bilateral lower extremities at this time\".     Patient has follow up with cardiologist in a few weeks to discuss angina related symptoms with pulling/pushing weighted items (specifically notes when taking out the garbage).         Current pain: 3-4/10 sensory pain to the left anterior thigh         Objective:  25x calf raise test = able to complete without achieving full PF AROM after repetitions 15    TTP = rectus femoris, VL, ITB          Treatment:  Neuromuscular Re-education = 2 units  Glute bridge isometric holds   1st set = 1x5x5\"   2nd set = 2x5x10\" holds   Supine knee to chest = 2x6  SLR = 2x6   Staggered stance squat = 3x10     Manual Therapy = 1 unit  IASTM = rectus femoris, ITB, VL remaining in cliff test position throughout       HEP: #3 "   Prescriptions  Prescribed  losartan 25 mg oral tablet: = 1 tab(s) ( 25 mg ), Oral, daily, # 90 tab(s), 1 Refill(s), Type: Maintenance, Pharmacy: [facility], 1 tab(s) Oral daily, 72, in, 05/28/21 9:02:00 CDT, Height Measured, 278, lb, 05/28/21 9:02:00 CDT, Weight Measured  metFORMIN 500 mg oral tablet, extended release: = 1 tab(s) ( 500 mg ), Oral, daily, # 30 tab(s), 0 Refill(s), Type: Maintenance, Pharmacy: [facility], 1 tab(s) Oral daily, 72, in, 05/28/21 9:02:00 CDT, Height Measured, 278, lb, 05/28/21 9:02:00 CDT, Weight Measured  sildenafil 20 mg oral tablet: See Instructions, Instructions: 1 tab(s) Oral up to 5 tabs daily as needed, # 50 EA, 11 Refill(s), Type: Maintenance, Pharmacy: [facility], 1 tab(s) Oral up to 5 tabs daily as needed, 72, in, 05/25/21 16:02:00 CDT, Height Pancho...,    Medications          *denotes recorded medication          losartan 25 mg oral tablet: 25 mg, 1 tab(s), Oral, daily, 90 tab(s), 1 Refill(s).          metFORMIN 500 mg oral tablet, extended release: 500 mg, 1 tab(s), Oral, daily, 30 tab(s), 0 Refill(s).          sildenafil 20 mg oral tablet: See Instructions, 1 tab(s) Oral up to 5 tabs daily as needed, 50 EA, 11 Refill(s).       Problem list:    All Problems  Type 2 diabetes, HbA1c goal < 7% / SNOMED CT 726307205 / Confirmed  Obesity / SNOMED CT 7468115534 / Probable  HTN (hypertension) / SNOMED CT 4456068849 / Confirmed  ED (erectile dysfunction) / SNOMED CT 6766510760 / Confirmed      Histories   Past Medical History:    No active or resolved past medical history items have been selected or recorded.   Family History:    No family history items have been selected or recorded.   Procedure history:    No active procedure history items have been selected or recorded.   Social History:        Electronic Cigarette/Vaping Assessment            Electronic Cigarette Use: Former use, quit more than 90 days  (Access Code: [email or web address])  - Supine Bridge with Gluteal Set and Spinal Articulation  - 1 x daily - 7 x weekly - 2 sets - 5 reps - 10 seconds hold  - Supine Knee to Chest with Leg Straight  - 1 x daily - 7 x weekly - 2 sets - 6 reps  - Supine Active Straight Leg Raise  - 1-2 x daily - 7 x weekly - 2 sets - 6 reps  - Single Leg Calf Raise  - 1 x daily - 7 x weekly - 1 sets - 15-20 reps  - Staggered Stance Squat  - 1 x daily - 7 x weekly - 2 sets - 10 reps      OP Education:   Discussion regarding progressing interventions independently at home between sessions when interventions become easier to perform           Diagnosis:  1. Lumbar radiculopathy    2. Nerve pain          Assessment:   Pt demonstrates tolerance to interventions in session, with observable improvements in patient's ability to perform independent SLR (with quad lag) compared to previous sessions when this was too difficult to achieve. He is educated and introduced to posterior chain interventions to accommodate for reduced nerve activity to multi lumbosacral segment lending to quadriceps weakness and knee hyperextension. Patient demonstrates increased confidence and ability to perform knee flexion movements when targeting the hamstrings and glutes rather than controlled eccentric activation of the quadriceps, as it is likely too difficult for the muscle to perform at this time. Patient also educated on the need to speak with neurologist regarding current nerve denervation (shown on EMG) as it relates to recovery post shingles diagnosis. Patient will continue to benefit from physical therapy services in order to return to prior level of function without compensatory motions during active movements.          Plan:  Assess response to initial interventions in session and HEP   Improve closed and open chain quadriceps muscle activation   Desensitization training to the left anterior thigh with varying items/surfaces   Single leg strength and neuro  ago.      Tobacco Assessment            Former smoker, quit more than 30 days ago        Physical Examination   Measurements from flowsheet : Measurements   6/1/2021 12:30 PM CDT Height Measured - Standard 72 in    Weight Measured - Standard 276 lb    BSA 2.52 m2    Body Mass Index 37.43 kg/m2  HI         Review / Management   Results review:  Lab results   5/28/2021 9:31 AM CDT Sodium Level 135 mmol/L    Potassium Level 4.8 mmol/L    Chloride Level 100 mmol/L    CO2 Level 29 mmol/L    Glucose Level 294 mg/dL  HI    BUN 18 mg/dL    Creatinine 0.80 mg/dL    BUN/Creat Ratio NOT APPLICABLE    eGFR 108 mL/min/1.73m2    eGFR African American 125 mL/min/1.73m2    Calcium Level 9.7 mg/dL    Bili Total 1.1 mg/dL    Alk Phos 98 unit/L    AST/SGOT 15 unit/L    ALT/SGPT 21 unit/L    Protein Total 7.8 gm/dL    Albumin Level 4.3 gm/dL    Globulin 3.5    A/G Ratio 1.2    TSH 2.03 mIU/L    U Creatinine 138 mg/dL    U Microalbumin 4.4 mg/dL    Ur Microalb/Creat Ratio 32  HI    WBC 5.1    RBC 5.08    Hgb 15.0 gm/dL    Hct 44.5 %    MCV 87.6 fL    MCH 29.5 pg    MCHC 33.7 gm/dL    RDW 13.1 %    Platelet 140    MPV 13.1 fL  HI    Lymphs 36.1 %    Abs Lymphs 1,841    Neutrophils 53.3 %    Abs Neutrophils 2,718    Monocytes 8.6 %    Abs Monocytes 439    Eosinophils 1.4 %    Abs Eosinophils 71    Basophils 0.6 %    Abs Basophils 31   5/26/2021 4:40 PM CDT Hgb A1c 10.4  HI   5/25/2021 4:32 PM CDT Glucose Level 315 mg/dL  HI    Cholesterol 146 mg/dL    Non-    HDL 38 mg/dL  LOW    Chol/HDL Ratio 3.8    LDL 87    Triglyceride 112 mg/dL   .       Impression and Plan   Diagnosis     Type 2 diabetes, HbA1c goal < 7% (SJB01-GU E11.9).     Obesity (YSV39-ET E66.9).     HTN (hypertension) (HMV37-ED I10).       Counseled: Patient, LACEY Self Care Behaviors   Today the patient was instructed on the basic physiology of diabetes mellitus, BG testing, and lifestyle guidelines.  Healthy Eating - Education on what foods are primary sources of  re-education beginning in sitting and progressing to standing        Goals:  Patient will be independent with HEP. (Short-term)  MET  Patient will improve in SHERIDAN outcome measure <35% disability in six weeks. (Short-term)  10% MET   Patient will improve in SHERIDAN outcome measure to <25% disability in 12 weeks. (Long-term)  Patient will demonstrate the ability to perform sit to stand transfers with the use of at least 50% of body weight to the involved lower extremity in six weeks (short-term).   15% MET   Patient will demonstrate the ability to perform a full sit to stand transfer with 100% weight-bearing on bilateral limbs in 12 weeks without pain. (Long-term)  10% MET   Patient will be able to ambulate 50 feet without the use of a single point mancilla and less than a  3/10 pain scale in six weeks (short-term).   30% MET   Patient will be able to ambulate 150 feet without the use of a single point cane and no pain in 12 weeks. (Long-term)  15% MET   Patient will demonstrate symmetrical strength in lower extremity myotome screening in 12 weeks. (Long-term)  10% MET             Brayan Strong, PT   carbohydrates and how intake affects BG readings, edu on carbohydrate counting, reading food labels, appropriate portion sizes, well balanced meals, diabetic plate method as a general rule.  Patient is provided with ideas to incorporate dietary recommendations, meal planning and preparation, mindful eating techniques and weight loss tips.    Being Active - Education on how exercise helps with :    - lowering BG by increasing the muscles ability to take up and use glucose   - weight loss   - healthier heart (improve lipid profile)   - improve sleep, mood, energy   - decrease stress      Monitoring - Today the patient is instructed on home blood glucose monitoring.  Pt is provided with a meter.  Pt is instructed on the proper use of the meter, recommended testing schedule and blood glucose target levels.  The patient is able to return appropriate demonstration of the use of the meter.  Pt is instructed on proper disposal of lancets.    Taking Medication - on Metformin - education on the mechanism of action. Discussed gradual progression of diabetes and potential to increase/ add medication in the future.      Problem Solving - medical support team assistance, resources provided (written and apps, internet); good control of stress  Healthy Coping - support of friends, family, and medical support team   Reducing Risks - Will discuss at f/u apts.  Weight loss goal of 7 - 10% of current body weight pounds as a reasonable goal to help increase insulin sensitivity   .      Goals:  1.  A1c <6.5%  2.  BG testing 2x/ day on 2 days/ week before eating 80 - 130 target; two hours after eating 80 - 150mg/dL  3.  Physical active 150 min/ week   4.  Carb controlled heart healthy meal plan <130gm CHO/ day  5.  Take medications as directed continue metformin ER 500mg i tab daily    Call in 1-2 weeks to review glucose readings, follow up in 2 mo       Professional Services   Time spent with pt 45 min   cc  Dr. Dodge

## 2025-03-25 ENCOUNTER — TREATMENT (OUTPATIENT)
Dept: PHYSICAL THERAPY | Facility: CLINIC | Age: 75
End: 2025-03-25
Payer: MEDICARE

## 2025-03-25 DIAGNOSIS — M54.16 LUMBAR RADICULOPATHY: ICD-10-CM

## 2025-03-25 DIAGNOSIS — M79.2 NERVE PAIN: ICD-10-CM

## 2025-03-25 PROCEDURE — 97110 THERAPEUTIC EXERCISES: CPT | Mod: GP

## 2025-03-25 PROCEDURE — 97140 MANUAL THERAPY 1/> REGIONS: CPT | Mod: GP

## 2025-03-25 PROCEDURE — 97112 NEUROMUSCULAR REEDUCATION: CPT | Mod: GP

## 2025-03-25 NOTE — PROGRESS NOTES
"Physical Therapy    Physical Therapy Treatment    Patient Name: Blaise Pope  MRN: [id number]  Today's Date: 3/25/2025  Time Calculation  Start Time: 0935  Stop Time: 1020  Time Calculation (min): 45 min        Insurance:  Visit: #6  Authorized: 20  Certification:  2/17/2025 - 2/17/2026   Payor: MEDICARE / Plan: MEDICARE PART A AND B / Product Type: *No Product type* /       Subjective:  Patient reports to physical therapy with improvements in anterior knee and thigh pain post shingles with recent nerve block injection last week and yesterday receiving a localized pain injection to the mid thigh. This has resulted in an improvement in walking tolerance and functional tolerance, with patient unable to determine if positive response is from previous session manual therapy or recent injections.     Patient also obtained xray imaging of left knee showing the following results:   Moderate to severe narrowing of the medial compartment without significant change.  Small patellofemoral osteophytes.         Current pain: 4/10 sensory pain to the left anterior thigh         Objective:  25x calf raise test = able to complete without achieving full PF AROM after repetitions 15    TTP = rectus femoris, VL, ITB          Treatment:  Therapeutic exercise = 1 units   Trunk rotations = 10x each side  Review of HEP   Glute bridge isometric holds = [id number]\" holds   Knee to chest = 2x6     Neuromuscular Re-education = 1 units  Prone femoral nerve glides = 2x10   NMES (no on/off, rate 10, level 35)  LAQ = 15 seconds on, 10 seconds off (3 minutes)   SL leg press isometric (290#, 30 degrees of knee flexion) = 3x45\" holds       Manual Therapy = 1 unit  IASTM = rectus femoris, ITB, VL remaining in cliff test position throughout       HEP: #3 (Access Code: [email or web address])  - Supine Bridge with Gluteal Set and Spinal Articulation  - 1 x daily - 7 x weekly - 2 sets - 5 reps - 10 seconds hold  - Supine Knee to Chest with Leg Straight  - 1 x daily " - 7 x weekly - 2 sets - 6 reps  - Supine Active Straight Leg Raise  - 1-2 x daily - 7 x weekly - 2 sets - 6 reps  - Single Leg Calf Raise  - 1 x daily - 7 x weekly - 1 sets - 15-20 reps  - Staggered Stance Squat  - 1 x daily - 7 x weekly - 2 sets - 10 reps      OP Education:   Discussion regarding progressing interventions independently at home between sessions when interventions become easier to perform           Diagnosis:  1. Lumbar radiculopathy    2. Nerve pain          Assessment:   Patient most recent visit with PCP receiving nerve block and pain injection has aided in reducing resting pain levels and discomfort. Patient communicated by PCP to discuss imaging findings with ortho, however I discussed with patient the need to determine if current knee pain is the result of shingles postherpetic pain or arthritic changes.     Pt demonstrates positive response in session to introduction of femoral nerve glides in prone, reducing anterior thigh compression and tightness upon completion of intervention. Patient also introduced to supine trunk rotations to maintain lumbar mobility and further reduce nerve irritation with functional movements. Electrical stimulation with NMES mode to the VMO and rectus femoris muscle belly utilized at the end of session to induce neuromuscular re-education in open and closed chain positions. Patient education regarding current positive progression and expected prognosis for motor recovery, patient understands unpredictable nature of nerve regeneration and continued participation in physical therapy.     Patient will continue to benefit from physical therapy services in order to return to prior level of function without compensatory motions during active movements.          Plan:  Assess response to initial interventions in session and HEP   Improve closed and open chain quadriceps muscle activation   Desensitization training to the left anterior thigh with varying items/surfaces    Single leg strength and neuro re-education beginning in sitting and progressing to standing        Goals:  Patient will be independent with HEP. (Short-term)  MET  Patient will improve in SHERIDAN outcome measure <35% disability in six weeks. (Short-term)  10% MET   Patient will improve in SHERIDAN outcome measure to <25% disability in 12 weeks. (Long-term)  Patient will demonstrate the ability to perform sit to stand transfers with the use of at least 50% of body weight to the involved lower extremity in six weeks (short-term).   25% MET   Patient will demonstrate the ability to perform a full sit to stand transfer with 100% weight-bearing on bilateral limbs in 12 weeks without pain. (Long-term)  20% MET   Patient will be able to ambulate 50 feet without the use of a single point mancilla and less than a  3/10 pain scale in six weeks (short-term).   40% MET   Patient will be able to ambulate 150 feet without the use of a single point cane and no pain in 12 weeks. (Long-term)  25% MET   Patient will demonstrate symmetrical strength in lower extremity myotome screening in 12 weeks. (Long-term)  20% MET             Brayan Strong, PT

## 2025-04-01 ENCOUNTER — TREATMENT (OUTPATIENT)
Dept: PHYSICAL THERAPY | Facility: CLINIC | Age: 75
End: 2025-04-01
Payer: MEDICARE

## 2025-04-01 DIAGNOSIS — M54.16 LUMBAR RADICULOPATHY: Primary | ICD-10-CM

## 2025-04-01 DIAGNOSIS — M79.2 NERVE PAIN: ICD-10-CM

## 2025-04-01 PROCEDURE — 97140 MANUAL THERAPY 1/> REGIONS: CPT | Mod: GP

## 2025-04-01 PROCEDURE — 97112 NEUROMUSCULAR REEDUCATION: CPT | Mod: GP

## 2025-04-01 NOTE — PROGRESS NOTES
"Physical Therapy    Physical Therapy Treatment    Patient Name: Blaise Pope  MRN: [id number]  Today's Date: 4/1/2025  Time Calculation  Start Time: 0930  Stop Time: 1013  Time Calculation (min): 43 min        Insurance:  Visit: #7  Authorized: 20  Certification:  2/17/2025 - 2/17/2026   Payor: MEDICARE / Plan: MEDICARE PART A AND B / Product Type: *No Product type* /       Subjective:  Patient reports to physical therapy with improvements in previous \"burning\" sensation pain that was previously the biggest issue. He has been experiencing more medial knee pain especially when rotating over in bed transitioning from one position to another.         Current pain:   3/10 sensory pain to the left anterior thigh   0/10 to medial knee pain         Objective:  TTP = rectus femoris, VL, ITB    Patella mobility  Inferior = 1+       Outcome measure  SHERIDAN = 7% disability   LEFS = 62/80          Treatment:  Neuromuscular Re-education = 2 units  NMES - pad placement at VMO and proximal rectus (20\"on/ 15\" off, rate 50, level 21)  LAQ = 3 minutes  SLR = 3 minutes  Glute med raise in sidelying = 3 minutes   SL leg press (50#) = 3 minutes   TENS - pads placement to medial thigh (no on/off, rate 150, level) = 8 minutes     Manual Therapy = 1 unit  STM = rectus femoris, ITB, VL remaining in cliff test position throughout   Inferior grade 2 oscillatory mobilization       HEP: #3 (Access Code: [email or web address])  - Supine Bridge with Gluteal Set and Spinal Articulation  - 1 x daily - 7 x weekly - 2 sets - 5 reps - 10 seconds hold  - Supine Knee to Chest with Leg Straight  - 1 x daily - 7 x weekly - 2 sets - 6 reps  - Supine Active Straight Leg Raise  - 1-2 x daily - 7 x weekly - 2 sets - 6 reps  - Single Leg Calf Raise  - 1 x daily - 7 x weekly - 1 sets - 15-20 reps  - Staggered Stance Squat  - 1 x daily - 7 x weekly - 2 sets - 10 reps      OP Education:   Discussion regarding progressing interventions independently at home between sessions " when interventions become easier to perform           Diagnosis:  1. Lumbar radiculopathy    2. Nerve pain            Assessment:   Use of NMES at the start of the session to the VMO and proximal rectus femoris muscle belly during multiple open and closed chain interventions. Patient notes that after performance of interventions in session including manual therapy, he has large reductions in anterior thigh wrapping sensation which has continued to be bothersome to this day. Overall, there has been drastic improvements in the pain sensation patient was previously experiencing to the anteromedial thigh, likely the result of postherpetic pain post shingles. Patient will continue to benefit from physical therapy services in order to return to prior level of function without compensatory motions during active movements.          Plan:  Assess response to initial interventions in session and HEP   Improve closed and open chain quadriceps muscle activation   Desensitization training to the left anterior thigh with varying items/surfaces   Single leg strength and neuro re-education beginning in sitting and progressing to standing        Goals:  Patient will be independent with HEP. (Short-term)  MET  Patient will improve in SHERIDAN outcome measure <35% disability in six weeks. (Short-term)  MET   Patient will improve in SHERIDAN outcome measure to <25% disability. (Long-term)  MET  Patient will improve in SHERIDAN outcome measure to <5% disability in 12 weeks. (Long-term)  75% met   Patient will demonstrate the ability to perform sit to stand transfers with the use of at least 50% of body weight to the involved lower extremity in six weeks (short-term).   35% MET   Patient will demonstrate the ability to perform a full sit to stand transfer with 100% weight-bearing on bilateral limbs in 12 weeks without pain. (Long-term)  25% MET   Patient will be able to ambulate 50 feet without the use of a single point mancilla and less than a  3/10  pain scale in six weeks (short-term).   MET   Patient will be able to ambulate 150 feet without the use of a single point cane and no pain in 12 weeks. (Long-term)  55% MET   Patient will demonstrate symmetrical strength in lower extremity myotome screening in 12 weeks. (Long-term)  25% MET             Brayan Strong, PT

## 2025-04-08 ENCOUNTER — TREATMENT (OUTPATIENT)
Dept: PHYSICAL THERAPY | Facility: CLINIC | Age: 75
End: 2025-04-08
Payer: MEDICARE

## 2025-04-08 DIAGNOSIS — M54.16 LUMBAR RADICULOPATHY: ICD-10-CM

## 2025-04-08 DIAGNOSIS — M79.2 NERVE PAIN: ICD-10-CM

## 2025-04-08 PROCEDURE — 97140 MANUAL THERAPY 1/> REGIONS: CPT | Mod: GP

## 2025-04-08 PROCEDURE — 97110 THERAPEUTIC EXERCISES: CPT | Mod: GP

## 2025-04-08 PROCEDURE — 97112 NEUROMUSCULAR REEDUCATION: CPT | Mod: GP

## 2025-04-08 NOTE — PROGRESS NOTES
"Physical Therapy    Physical Therapy Treatment    Patient Name: Blaise Pope  MRN: [id number]  Today's Date: 4/8/2025  Time Calculation  Start Time: 0935  Stop Time: 1015  Time Calculation (min): 40 min        Insurance:  Visit: #8  Authorized: 20  Certification:  2/17/2025 - 2/17/2026   Payor: MEDICARE / Plan: MEDICARE PART A AND B / Product Type: *No Product type* /       Subjective:  Patient reports to physical therapy with continued improvement in nerve related pain to the anteromedial thigh (nearly eliminated all previous discomfort) since the incorporation of lumbar mobility and nerve glides as part of HEP. Additionally, the wrapping sensation that patient has experienced continues to remain the most uncomfortable sensation which does improve with the interventions performed in clinic and has gotten better overall in the last few weeks.     He notes that there has been knee pain localized to the inside with most weight bearing movements coinciding with most recent imaging of joint degeneration.         Current pain:   3/10 sensory pain to the left anterior thigh   2/10 to medial knee pain         Objective:  TTP = VL, ITB    Joint mobility   L1-4 = 1+    Gait mechanics (end of session)  Reduced antalgic patterning when compared to previous sessions  Increased glenys and confidence with ambulation compared to previous sessions  Pt notes sensation of stability without discomfort to the knee by the end of the session         Treatment:  Neuromuscular Re-education = 1 units  Prone femoral nerve glides = 20x  Prone femoral nerve gliding into and out of position = 20x  SLR   Quad set 5\" hold before lift (5x)     Therapeutic Exercise = 1 unit  Supine trunk rotation (progressed to feet off the ground) = 2x5 each direction  Supine bridge with hip add iso = 10x10\" holds   Single Leg (SL) hamstring curl = 15x (30#)     Manual Therapy = 1 unit  PA grade 3 MWM with prone press up to L1-4   Lumbosacral grade 3 joint " mobilization   IASTM = rectus femoris, ITB, VL remaining in cliff test position throughout       HEP: #3 (Access Code: [email or web address])  - Prone Femoral Nerve Mobilization  - 1-2 x daily - 7 x weekly - 2 sets - 10 reps  - Supine Lower Trunk Rotation  - 1 x daily - 7 x weekly - 2 sets - 8 reps  - Hooklying Single Knee to Chest Stretch  - 1 x daily - 7 x weekly - 1 sets - 5 reps - 10 seconds hold  - Supine Bridge with Gluteal Set and Spinal Articulation  - 1 x daily - 7 x weekly - 1 sets - 10 reps - 10 seconds hold  - Supine Active Straight Leg Raise  - 1-2 x daily - 7 x weekly - 2 sets - 6 reps      OP Education:   Discussion regarding progressing interventions independently at home between sessions when interventions become easier to perform           Diagnosis:  1. Lumbar radiculopathy    2. Nerve pain            Assessment:   Introduced to patient in session were lumbosacral and thoracolumbar joint mobility with manual therapy as well as progressing trunk rotations to involve more of the deep abdominals. Patient continues to report that the performance of femoral nerve glides aids in reducing tension and discomfort to the anterior thigh in the moment and post completion of intervention. Interventions that have been performed since the beginning of therapy such as the straight leg raise (SLR) has drastically improved without the need for external support to perform, however continued quad lag observed. 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 Overall, patient has made great progress with physical therapy being able to improve his gait mechanics, confidence with ambulation/knee stability, and stair navigation. Patient will continue to benefit from physical therapy services in order to return to prior level of function without compensatory motions  during active movements.          Plan:  Assess response to initial interventions in session and HEP   Improve closed and open chain quadriceps muscle activation   Nerve glides   Single leg strength and neuro re-education beginning in sitting and progressing to standing        Goals:  Patient will be independent with HEP. (Short-term)  MET  Patient will improve in SHERIDAN outcome measure <35% disability in six weeks. (Short-term)  MET   Patient will improve in SHERIDAN outcome measure to <25% disability. (Long-term)  MET  Patient will improve in SHERIDAN outcome measure to <5% disability in 12 weeks. (Long-term)  75% met   Patient will demonstrate the ability to perform sit to stand transfers with the use of at least 50% of body weight to the involved lower extremity in six weeks (short-term).   35% MET   Patient will demonstrate the ability to perform a full sit to stand transfer with 100% weight-bearing on bilateral limbs in 12 weeks without pain. (Long-term)  25% MET   Patient will be able to ambulate 50 feet without the use of a single point mancilla and less than a  3/10 pain scale in six weeks (short-term).   MET   Patient will be able to ambulate 150 feet without the use of a single point cane and no pain in 12 weeks. (Long-term)  65% MET   Patient will demonstrate symmetrical strength in lower extremity myotome screening in 12 weeks. (Long-term)  25% MET             Brayan Strong, PT

## 2025-04-16 ENCOUNTER — TREATMENT (OUTPATIENT)
Dept: PHYSICAL THERAPY | Facility: CLINIC | Age: 75
End: 2025-04-16
Payer: MEDICARE

## 2025-04-16 DIAGNOSIS — M54.16 LUMBAR RADICULOPATHY: ICD-10-CM

## 2025-04-16 DIAGNOSIS — M79.2 NERVE PAIN: ICD-10-CM

## 2025-04-16 PROCEDURE — 97110 THERAPEUTIC EXERCISES: CPT | Mod: GP

## 2025-04-16 PROCEDURE — 97140 MANUAL THERAPY 1/> REGIONS: CPT | Mod: GP

## 2025-04-16 PROCEDURE — 97112 NEUROMUSCULAR REEDUCATION: CPT | Mod: GP

## 2025-04-16 NOTE — PROGRESS NOTES
"Physical Therapy    Physical Therapy Treatment    Patient Name: Blaise Pope  MRN: [id number]  Today's Date: 4/16/2025  Time Calculation  Start Time: 0931  Stop Time: 1015  Time Calculation (min): 44 min        Insurance:  Visit: #9  Authorized: 20  Certification:  2/17/2025 - 2/17/2026   Payor: MEDICARE / Plan: MEDICARE PART A AND B / Product Type: *No Product type* /       Subjective:  Patient reports to physical therapy after most recent gel injection to the lateral aspect of the left knee. He notes that initially he experienced increased discomfort the days following and into the weekend, whereas today he notes the first day of some relief from initial discomfort.         Current pain:   1/10 sensory pain to the left anterior thigh   4/10 to medial knee pain         Objective:  Knee ROM  (L) flexion =    Pre-intervention = 115 A, 116 P with pain   Post-intervention = 130 A ; 135 P with less pain at end-range     Gait mechanics  Pre-intervention = antalgic with lateral lean to the left during left stance phase   Post-intervention = no antalgic posture or reports of pain       Treatment:  Neuromuscular Re-education = 1 units  Prone femoral nerve glides = 20x  Rocker board balance   SL PF/DF EO = 30\"   Compound set with neutral position EC 30\" holds   Double leg EO = 30\" with knee bent   Compound set with neutral position EC 30\" holds   Airex foam pad     SL cone drill (4 cones in half clock formation) =   Taps = 2x3 times to each cone each side       Therapeutic Exercise = 1 unit  Single leg (SL) hamstring curl = 2x15 (15#)   Manual stretching into hip ER = 4x30\" holds   Patient education   Updated HEP with minimizing total volume of exercises to allow for proper gel injection outcome     Manual Therapy = 1 unit  Tibiofemoral joint   (Supine) AP grade 2 oscillatory mob (anterior drawer position)   (Prone) PA grade 2 oscillatory mob with knee flexed to 90 degrees       HEP: #3 (Access Code: [email or web address])  - Prone " Femoral Nerve Mobilization  - 1-2 x daily - 7 x weekly - 2 sets - 10 reps  - Supine Lower Trunk Rotation  - 1 x daily - 7 x weekly - 2 sets - 8 reps  - Hooklying Single Knee to Chest Stretch  - 1 x daily - 7 x weekly - 1 sets - 5 reps - 10 seconds hold  - Supine Bridge with Gluteal Set and Spinal Articulation  - 1 x daily - 7 x weekly - 1 sets - 10 reps - 10 seconds hold  - Supine Active Straight Leg Raise  - 1-2 x daily - 7 x weekly - 2 sets - 6 reps      OP Education:   Discussion regarding progressing interventions independently at home between sessions when interventions become easier to perform           Diagnosis:  1. Lumbar radiculopathy    2. Nerve pain            Assessment:   Patient responds well in session to manual therapy to improve active range of motion into knee flexion, achieving 15 degrees further when comparing the start of the session to the end of the session. It is also evident when observing gait mechanics the large reduction in symptoms with an absence of lateral compensatory lean by the end of the session. Interventions in session are kept at a low volume and intensity as a result of most recent gel injection. Single leg balance interventions focusing on achieving partial knee flexion with therapist knee blocking utilized to reduce risk of knee buckling. Patient notes improvement in confidence with knee flexion positions when compared to the beginning of physical therapy. Patient will continue to benefit from physical therapy services in order to maximize functional outcomes.          Plan:  Assess response to initial interventions in session and HEP   Improve closed and open chain quadriceps muscle activation   Nerve glides   Single leg strength and neuro re-education beginning in sitting and progressing to standing        Goals:  Patient will be independent with HEP. (Short-term)  MET  Patient will improve in SHERIDAN outcome measure <35% disability in six weeks. (Short-term)  MET   Patient  will improve in SHERIDAN outcome measure to <25% disability. (Long-term)  MET  Patient will improve in SHERIDAN outcome measure to <5% disability in 12 weeks. (Long-term)  75% met   Patient will demonstrate the ability to perform sit to stand transfers with the use of at least 50% of body weight to the involved lower extremity in six weeks (short-term).   35% MET   Patient will demonstrate the ability to perform a full sit to stand transfer with 100% weight-bearing on bilateral limbs in 12 weeks without pain. (Long-term)  25% MET   Patient will be able to ambulate 50 feet without the use of a single point mancilla and less than a  3/10 pain scale in six weeks (short-term).   MET   Patient will be able to ambulate 150 feet without the use of a single point cane and no pain in 12 weeks. (Long-term)  65% MET   Patient will demonstrate symmetrical strength in lower extremity myotome screening in 12 weeks. (Long-term)  25% MET             Brayan Strong, PT

## 2025-04-23 ENCOUNTER — TREATMENT (OUTPATIENT)
Dept: PHYSICAL THERAPY | Facility: CLINIC | Age: 75
End: 2025-04-23
Payer: MEDICARE

## 2025-04-23 DIAGNOSIS — M79.2 NERVE PAIN: ICD-10-CM

## 2025-04-23 DIAGNOSIS — M54.16 LUMBAR RADICULOPATHY: ICD-10-CM

## 2025-04-23 PROCEDURE — 97110 THERAPEUTIC EXERCISES: CPT | Mod: GP

## 2025-04-23 PROCEDURE — 97112 NEUROMUSCULAR REEDUCATION: CPT | Mod: GP

## 2025-04-23 NOTE — PROGRESS NOTES
"Physical Therapy    Physical Therapy Treatment    Patient Name: Blaise Pope  MRN: [id number]  Today's Date: 4/23/2025  Time Calculation  Start Time: 0933  Stop Time: 1018  Time Calculation (min): 45 min        Insurance:  Visit: #9  Authorized: 20  Certification:  2/17/2025 - 2/17/2026   Payor: MEDICARE / Plan: MEDICARE PART A AND B / Product Type: *No Product type* /       Subjective:  Patient reports to physical therapy with continued overall improvement in nerve related symptoms to the anteromedial thigh but increased discomfort to the left tibiofemoral joint. Despite the most recent gel injection into the knee, he denies any large/drastic improvement in symptoms. Today it feels stiff and painful during weight bearing movements.     Denies any falls or stumbles since the most previous session. He admits the left leg did give out a few times but he has been able to self correct so that he does not fall similarly in the past.         Current pain:   1/10 sensory pain to the left anterior thigh   3/10 to medial knee pain         Objective:  Knee ROM  (L) flexion =    Pre-intervention = 127 A, 130 P with pain   Post-intervention = 130 A     Gait mechanics  Pre-intervention = antalgic with lateral lean to the left during left stance phase   Post-intervention = reductions in stiffness with continued medial joint line pain       Treatment:  Neuromuscular Re-education = 2 units  Review of HEP   Prone femoral nerve glides = 20x  Added to Hep   Prone knee flexion with ankle inversion = 2x15   Supine TA with hip adduction iso = 2x30\" holds   Trunk rotations with hip adduction isometric = 2x8   Double knee to chest = 2x5   NMES = (level 25, no on/off, rate 65) ---- pad placement to VMO and rectus femoris   LAQ =   1x40 (7.5#)   1x25 (15#)       Therapeutic Exercise = 1 unit  Nu-step = 4 minutes (level 0)   Review of HEP   Glute bridge with hip adduction iso = 10x10\" holds   Patient education   Updated HEP with minimizing " total volume of exercises to allow for proper gel injection outcome     Manual Therapy = 0 unit  Tibiofemoral joint   (Supine) AP grade 2 oscillatory mob (anterior drawer position)   (Prone) PA grade 2 oscillatory mob with knee flexed to 90 degrees       HEP: #4 (Access Code: [email or web address])  - Prone Femoral Nerve Mobilization  - 1-2 x daily - 7 x weekly - 2 sets - 10 reps  - Prone Knee Flexion AROM  - 1 x daily - 7 x weekly - 3 sets - 15-20 reps  - Supine Lower Trunk Rotation  - 1 x daily - 7 x weekly - 2 sets - 8 reps  - Supine Hip Adduction Isometric with Ball  - 1 x daily - 7 x weekly - 2 sets - 30 seconds hold  - Supine Bridge with Gluteal Set and Spinal Articulation  - 1 x daily - 4-7 x weekly - 1 sets - 10 reps - 10 seconds hold  - Seated Knee Extension AROM  - 1 x daily - 4-7 x weekly - 2 sets - 25-30 reps  - Supine Active Straight Leg Raise  - 1-2 x daily - 4-7 x weekly - 2 sets - 6 reps      OP Education:   Discussion regarding progressing interventions independently at home between sessions when interventions become easier to perform           Diagnosis:  1. Lumbar radiculopathy    2. Nerve pain            Assessment:   Patient continues to respond well to joint mobilizations in session to improve tolerance into knee flexion positions when comparing pre to post intervention. Introduction to proper knee mechanics with flexion including tibial internal rotation was educated and provided to patient as part of HEP further reducing pain with AROM into knee flexion and maintaining the ability to achieve full AROM throughout the session.     Nerve related atrophy of the anterior thigh musculature has created compensatory knee hyperextension with weight bearing movements which has likely contributed to patients increase in knee pain and instability. If symptoms persist patient will likely need to consider additional interventions to address from orthopedic.     At this time, he has made large strides in progress since  the initial evaluation. His previous nerve related pain to the medial thigh has subsided and the amount of muscle weakness has reduced with the incorporation of interventions in session and as part of HEP. However, he still lacks proper strength and stability of the LLE at the tibiofemoral joint causing instability and unsteadiness with ambulation on level and uneven surfaces. Patient will continue to benefit from physical therapy services in order to maximize functional outcomes.          Plan:  Assess response to initial interventions in session and HEP   Improve closed and open chain quadriceps muscle activation   Nerve glides   Single leg strength and neuro re-education beginning in sitting and progressing to standing        Goals:  Patient will be independent with HEP. (Short-term)  MET    OUTCOME MEASURE:   Patient will improve in SHERIDAN outcome measure <35% disability in six weeks. (Short-term)  MET   Patient will improve in SHERIDAN outcome measure to <25% disability. (Long-term)  MET  Patient will improve in SHERIDAN outcome measure to <5% disability in 12 weeks. (Long-term)  75% met     FUNCTION:  Patient will demonstrate the ability to perform sit to stand transfers with the use of at least 50% of body weight to the involved lower extremity in six weeks (short-term).   45% MET   Patient will demonstrate the ability to perform a full sit to stand transfer with 100% weight-bearing on bilateral limbs in 12 weeks without pain. (Long-term)  35% MET     AMBULATION:  Patient will be able to ambulate 50 feet without the use of a single point mancilla and less than a  3/10 pain scale in six weeks (short-term).   MET   Patient will be able to ambulate 150 feet without the use of a single point cane and no pain in 12 weeks. (Long-term)  65% MET   Patient will demonstrate symmetrical strength in lower extremity myotome screening in 12 weeks. (Long-term)  25% MET             Brayan Strong, PT

## 2025-04-30 ENCOUNTER — TREATMENT (OUTPATIENT)
Dept: PHYSICAL THERAPY | Facility: CLINIC | Age: 75
End: 2025-04-30
Payer: MEDICARE

## 2025-04-30 DIAGNOSIS — M54.16 LUMBAR RADICULOPATHY: ICD-10-CM

## 2025-04-30 DIAGNOSIS — M79.2 NERVE PAIN: ICD-10-CM

## 2025-04-30 PROCEDURE — 97110 THERAPEUTIC EXERCISES: CPT | Mod: GP

## 2025-04-30 PROCEDURE — 97112 NEUROMUSCULAR REEDUCATION: CPT | Mod: GP

## 2025-04-30 NOTE — PROGRESS NOTES
"Physical Therapy    Physical Therapy Treatment    Patient Name: Blaise Pope  MRN: [id number]  Today's Date: 4/30/2025  Time Calculation  Start Time: 0932  Stop Time: 1020  Time Calculation (min): 48 min        Insurance:  Visit: #10  Authorized: 20  Certification:  2/17/2025 - 2/17/2026   Payor: MEDICARE / Plan: MEDICARE PART A AND B / Product Type: *No Product type* /       Subjective:  Patient reports to physical therapy noting that he no longer experiences previous \"wrapping\" sensation to the front of the thigh and pain sensation from previous bout of shingles. He now has been having increased discomfort to the medial joint line which has been negatively impacting his ability to ambulate and perform stair navigation with the fear of falling or buckling at the knee. At this time, he has not felt positive benefit or relief of symptoms from previous injection to the knee.         Current pain:   0/10 sensory pain to the left anterior thigh   4-5/10 to medial knee pain         Objective:  Gait mechanics  Pre-intervention = antalgic with lateral lean to the left during left stance phase   Post-intervention = reductions in stiffness with continued medial joint line pain       Sit to stand   Lateral lean and trunk shift to the right when ascending to upright position       Treatment:  Neuromuscular Re-education = 2 units  Added to HEP   Single leg reverse TKE (black theraband) = 3x10   Prone knee flexion with tibial internal rotation (YTB) = 2x10   Unilateral wall heel raise = 2x30\" holds   Attempted but not part of HEP   Quarter wall squat (attempted)       Therapeutic Exercise = 1 unit  Pedal bike = 5 minutes (level 12)   Seated leg extension isometrics = 5x20\" holds (10#)   Review of HEP    Prone knee flexion with tibial internal rotation = x20   Patient education   Updated HEP with dosage and parameters       HEP: #5 (Access Code: [email or web address])  - Standing Terminal Knee Extension with Resistance  - 1 x daily - 7 x " weekly - 2 sets - 20 reps  - Prone Knee Flexion AROM  - 1 x daily - 7 x weekly - 2 sets - 10 reps - 5-10 seconds hold  - Controlled Knee Bending on Wall  - 1 x daily - 7 x weekly - 3 sets - 30 seconds hold  - Supine Bridge with Gluteal Set and Spinal Articulation  - 1 x daily - 4-7 x weekly - 1 sets - 10 reps - 10 seconds hold  - Supine Hip Adduction Isometric with Ball  - 1 x daily - 7 x weekly - 2 sets - 30 seconds hold      OP Education:   Discussion regarding progressing interventions independently at home between sessions when interventions become easier to perform           Diagnosis:  1. Lumbar radiculopathy    2. Nerve pain            Assessment:   Patient continues to have positive response to physical therapy interventions having elimination of previous pain-related sensations to the anterior thigh. However, as a result of peripheral nerve impairments from shingles patient developed a hyperextended knee posture in static standing and with ambulation which has irritated the tibiofemoral joint, leading to continued pain and limitations in functional capacity. Modifications were made in session to current HEP emphasizing the need to improve closed chain quadriceps loading in both an eccentric and concentric manner. Reassessing sit to stands demonstrates continued off loading of the involved LE due to weakness and pain regardless of the height of the chair rising from. After multiple attempts at various interventions, it was determined that patient was able to independently perform weight shifting on a flexed knee against the wall, initially requiring frequent cueing for positioning and set up. Provided updated HEP at the end of the session to reflect these changes. Patient will continue to benefit from physical therapy services in order to maximize functional outcomes.            Plan:  Assess response to initial interventions in session and HEP   Improve closed and open chain quadriceps muscle activation    Nerve glides   Single leg strength and neuro re-education beginning in sitting and progressing to standing        Goals:  Patient will be independent with HEP. (Short-term)  MET    OUTCOME MEASURE:   Patient will improve in SHERIDAN outcome measure <35% disability in six weeks. (Short-term)  MET   Patient will improve in SHERIDAN outcome measure to <25% disability. (Long-term)  MET  Patient will improve in SHERIDAN outcome measure to <5% disability in 12 weeks. (Long-term)  75% met     FUNCTION:  Patient will demonstrate the ability to perform sit to stand transfers with the use of at least 50% of body weight to the involved lower extremity in six weeks (short-term).   50% MET   Patient will demonstrate the ability to perform a full sit to stand transfer with 100% weight-bearing on bilateral limbs in 12 weeks without pain. (Long-term)  40% MET     AMBULATION:  Patient will be able to ambulate 50 feet without the use of a single point mancilla and less than a  3/10 pain scale in six weeks (short-term).   MET   Patient will be able to ambulate 150 feet without the use of a single point cane and no pain in 12 weeks. (Long-term)  65% MET   Patient will demonstrate symmetrical strength in lower extremity myotome screening in 12 weeks. (Long-term)  25% MET             Brayan Strong, PT

## 2025-05-07 ENCOUNTER — TREATMENT (OUTPATIENT)
Dept: PHYSICAL THERAPY | Facility: CLINIC | Age: 75
End: 2025-05-07
Payer: MEDICARE

## 2025-05-07 DIAGNOSIS — M79.2 NERVE PAIN: ICD-10-CM

## 2025-05-07 DIAGNOSIS — M54.16 LUMBAR RADICULOPATHY: ICD-10-CM

## 2025-05-07 PROCEDURE — 97112 NEUROMUSCULAR REEDUCATION: CPT | Mod: GP

## 2025-05-07 PROCEDURE — 97110 THERAPEUTIC EXERCISES: CPT | Mod: GP

## 2025-05-07 NOTE — PROGRESS NOTES
"Physical Therapy    Physical Therapy Treatment    Patient Name: Blaise Pope  MRN: [id number]  Today's Date: 5/7/2025  Time Calculation  Start Time: 1015  Stop Time: 1059  Time Calculation (min): 44 min        Insurance:  Visit: #11  Authorized: 20  Certification:  2/17/2025 - 2/17/2026   Payor: MEDICARE / Plan: MEDICARE PART A AND B / Product Type: *No Product type* /       Subjective:  Patient reports to physical therapy with improvements in overall medial knee pain from the previous session, where alterations were made to current HEP focusing on reducing knee buckling sensation and tolerance into knee flexion positions. He admits that there were a few times of buckling sensation to the knee but he has become more aware of how to alter the knee position in the moment to avoid increased chances/risks of leading to falls.       Current pain:   0/10 sensory pain to the left anterior thigh   2/10 to medial knee pain         Objective:  Gait mechanics  Pre-intervention = antalgic with lateral lean to the left during left stance phase   Post-intervention = reductions in stiffness with continued medial joint line pain       Sit to stand   Lateral lean and trunk shift to the right when ascending to upright position       Treatment:  Neuromuscular Re-education = 2 units  Review of HEP   Single leg reverse TKE (cable column) = 3x10 (20#)   2nd set focus on mimicking gait mechanics with movement to further reduce knee hyperextension   Unilateral wall sit (modified with runner position hold) = 3x30\" holds   Bridge isometric with unilateral external perturbations (using swiss ball) = [id number]\" holds       Therapeutic Exercise = 1 unit  Ellyptical = 2 minutes   Seated leg extension isometrics = 5x20\" holds (10#)   Added to HEP   Unilateral glute bridge = 2x8 each side       HEP: #5 (Access Code: [email or web address])  - Standing Terminal Knee Extension with Resistance  - 1 x daily - 7 x weekly - 2 sets - 20 reps  - Controlled Knee " Bending on Wall  - 1 x daily - 7 x weekly - 3 sets - 30 seconds hold  - Prone Knee Flexion AROM  - 1 x daily - 7 x weekly - 2 sets - 10 reps - 5-10 seconds hold  - Single Leg Bridge  - 1 x daily - 4-7 x weekly - 2 sets - 8 reps - 1-2 seconds hold  - Supine Hip Adduction Isometric with Ball  - 1 x daily - 4-7 x weekly - 2 sets - 30 seconds hold      OP Education:   Discussion regarding progressing interventions independently at home between sessions when interventions become easier to perform           Diagnosis:  1. Lumbar radiculopathy    2. Nerve pain            Assessment:   Patient continues to demonstrate improved tolerance to eccentric quadriceps interventions aiming to improve tolerance to knee flexion movements during ambulation and stair navigation. He has already experienced improvements in one week's time with medial joint line pain and improvements in subjective knee tolerance to flexion postures and positions required for ambulation, stair/curb navigation, and transfers/mobility after most recent update to HEP. Review of those interventions were performed in session requiring frequent verbal cueing for the initial set up and performance but able to become independent by the end of the session. Progression was made from bilateral glute bridge towards single leg glute bridge, further emphasizing unilateral strength to the posterior chain. At this time, patient has been progressing in a positive manner and fashion since beginning physical therapy from neuromuscular limitations as a result of shingles to now regaining further function in closed and open chain positions. He remains to have a good prognosis for recovery, requiring additional time for healing and re-education to occur for the LE musculature. Patient will continue to benefit from physical therapy services in order to maximize functional outcomes.            Plan:  Assess response to initial interventions in session and HEP   Improve closed and  open chain quadriceps muscle activation   Nerve glides   Single leg strength and neuro re-education beginning in sitting and progressing to standing        Goals:  Patient will be independent with HEP. (Short-term)  MET    OUTCOME MEASURE:   Patient will improve in SHERIDAN outcome measure <35% disability in six weeks. (Short-term)  MET   Patient will improve in SHERIDAN outcome measure to <25% disability. (Long-term)  MET  Patient will improve in SHERIDAN outcome measure to <5% disability in 12 weeks. (Long-term)  75% met     FUNCTION:  Patient will demonstrate the ability to perform sit to stand transfers with the use of at least 50% of body weight to the involved lower extremity in six weeks (short-term).   50% MET   Patient will demonstrate the ability to perform a full sit to stand transfer with 100% weight-bearing on bilateral limbs in 12 weeks without pain. (Long-term)  40% MET     AMBULATION:  Patient will be able to ambulate 50 feet without the use of a single point mancilla and less than a  3/10 pain scale in six weeks (short-term).   MET   Patient will be able to ambulate 150 feet without the use of a single point cane and no pain in 12 weeks. (Long-term)  65% MET   Patient will demonstrate symmetrical strength in lower extremity myotome screening in 12 weeks. (Long-term)  25% MET             Brayan Strong, PT

## 2025-05-14 ENCOUNTER — TREATMENT (OUTPATIENT)
Dept: PHYSICAL THERAPY | Facility: CLINIC | Age: 75
End: 2025-05-14
Payer: MEDICARE

## 2025-05-14 DIAGNOSIS — M79.2 NERVE PAIN: ICD-10-CM

## 2025-05-14 DIAGNOSIS — M54.16 LUMBAR RADICULOPATHY: ICD-10-CM

## 2025-05-14 PROCEDURE — 97530 THERAPEUTIC ACTIVITIES: CPT | Mod: GP

## 2025-05-14 PROCEDURE — 97110 THERAPEUTIC EXERCISES: CPT | Mod: GP

## 2025-05-14 NOTE — PROGRESS NOTES
Physical Therapy    Physical Therapy Treatment    Patient Name: Blaise Pope  MRN: [id number]  Today's Date: 5/14/2025  Time Calculation  Start Time: 1020  Stop Time: 1102  Time Calculation (min): 42 min        Insurance:  Visit: #12  Authorized: 20  Certification:  2/17/2025 - 2/17/2026   Payor: MEDICARE / Plan: MEDICARE PART A AND B / Product Type: *No Product type* /       Subjective:  The patient presents to physical therapy reporting persistent anterior left knee tightness. He reports successfully navigating stairs at home for the first time in six months, leading with the right leg both ascending and descending. He notes significant improvement in left knee pain over the past few weeks, along with increased confidence in curb negotiation and ambulation. He is now able to control and stabilize himself during episodes of knee instability, preventing falls. Previously, such episodes resulted in altered movement patterns and frequent falls.      Current pain:   0/10 sensory pain to the left anterior thigh   2/10 to medial knee pain         Objective:  Stair navigation   Able to perform independently with unilateral hand support on right side of handrail   Descending stairs patient has more difficult time controlling eccentric movement into knee flexion on the LLE        Treatment:  Therapeutic Activity = 1 unit   Discussion regarding stair navigation for proper mechanics  Added to Hep   Unilateral step down (with LLE) and unilateral hand support = 4x5    Therapeutic Exercise = 2 unit  Upright Pedal bike = 5 minutes   Added to HEP   Prone knee extension (blue theraband) = 2x10   Review of HEP   Prone knee flexion (blue theraband) = 2x10   Unilateral glute bridge = 2x12     Gait Training = 0 units  Staggered stance weight shifting focusing on maintaining partial knee flexion throughout, similar to what is required during loading response of the gait cycle       HEP: #5 (Access Code: [email or web address])  - Standing Terminal  Knee Extension with Resistance  - 1 x daily - 7 x weekly - 2 sets - 20 reps  - Forward Step Down  - 1 x daily - 4 x weekly - 3 sets - 5 reps  - Knee Extension / Flexion   - 1 x daily - 5-7 x weekly - 2 sets - 10 reps - 5-10 seconds hold  - Single Leg Bridge  - 1 x daily - 4 x weekly - 2 sets - 12 reps - 1-2 seconds hold  - Supine Hip Adduction Isometric with Ball  - 1 x daily - 4-7 x weekly - 2 sets - 30 seconds hold  - Controlled Knee      OP Education:   Discussion regarding progressing interventions independently at home between sessions when interventions become easier to perform           Diagnosis:  1. Lumbar radiculopathy    2. Nerve pain            Assessment:   Patient continues to respond positively to physical therapy interventions addressing quadriceps control during eccentric movements that were impacted by previous neurological insult. Discussion with proper stair navigation and mechanics occurred at the onset of session, leading to the addition of single leg tap downs to be performed as part of home exercise plan. The end of the session, gait training interventions including staggered stance loading response to the quadriceps was utilized as a way for patient to begin understanding how to load the knee in a semi flexed position without hyper extension. Will continue to utilize these interventions to improve overall gait mechanics in the following sessions. Patient will continue benefit from physical therapy services in order to maximize functional outcomes.         Plan:  Assess response to initial interventions in session and HEP   Improve closed and open chain quadriceps muscle activation   Nerve glides   Single leg strength and neuro re-education beginning in sitting and progressing to standing        Goals:  Patient will be independent with HEP. (Short-term)  MET    OUTCOME MEASURE:   Patient will improve in SHERIDAN outcome measure <35% disability in six weeks. (Short-term)  MET   Patient will  improve in SHERIDAN outcome measure to <25% disability. (Long-term)  MET  Patient will improve in SHERIDAN outcome measure to <5% disability in 12 weeks. (Long-term)  75% met     FUNCTION:  Patient will demonstrate the ability to perform sit to stand transfers with the use of at least 50% of body weight to the involved lower extremity in six weeks (short-term).   60% MET   Patient will demonstrate the ability to perform a full sit to stand transfer with 100% weight-bearing on bilateral limbs in 12 weeks without pain. (Long-term)  50% MET     AMBULATION:  Patient will be able to ambulate 50 feet without the use of a single point mancilla and less than a  3/10 pain scale in six weeks (short-term).   MET   Patient will be able to ambulate 150 feet without the use of a single point cane and no pain in 12 weeks. (Long-term)  75% MET   Patient will demonstrate symmetrical strength in lower extremity myotome screening in 12 weeks. (Long-term)  35% MET             Brayan Strong, PT

## 2025-05-21 ENCOUNTER — TREATMENT (OUTPATIENT)
Dept: PHYSICAL THERAPY | Facility: CLINIC | Age: 75
End: 2025-05-21
Payer: MEDICARE

## 2025-05-21 DIAGNOSIS — M54.16 LUMBAR RADICULOPATHY: ICD-10-CM

## 2025-05-21 DIAGNOSIS — M79.2 NERVE PAIN: ICD-10-CM

## 2025-05-21 PROCEDURE — 97164 PT RE-EVAL EST PLAN CARE: CPT | Mod: GP

## 2025-05-21 PROCEDURE — 97110 THERAPEUTIC EXERCISES: CPT | Mod: GP

## 2025-05-21 NOTE — PROGRESS NOTES
"Physical Therapy    Physical Therapy Re-evaluation & Treatment    Patient Name: Blaise Pope  MRN: [id number]  Today's Date: 5/21/2025  Time Calculation  Start Time: 1020  Stop Time: 1100  Time Calculation (min): 40 min        Insurance:  Visit: #13  Authorized: 20  Certification:  2/17/2025 - 2/17/2026   Payor: MEDICARE / Plan: MEDICARE PART A AND B / Product Type: *No Product type* /       Subjective:  The patient presents to physical therapy with continued improvement in left anterior knee symptoms at rest and with ambulation. Denies any falls or stumbles since the last session, but at times does continue to report that when he does have a small buckle that he has been able to counteract the movement to reduce chances of falling. Has remained compliant to all interventions weekly to this point which has contributed to positive benefit from physical therapy.       Current pain:   0/10 sensory pain to the left anterior thigh   2/10 to medial knee pain         Objective:  Myotomes   L1-2 (Hip Flexion) =   (L) =  4/5   (R) =  4/5   L3-4 (Knee Extension) =   (L) =  4/5  (R) =  4+/5  L4 (Ankle DF) =   (L) =  4+/5  (R) =  4+/5    Functional Measures  5x STS = 8 seconds  25x SL calf raise = able to perform on LLE       Single Leg Squat   R =   Sitting in chair (90 degrees of knee flexion) = able to perform 3x reps   L =   Sitting in chair = unable to perform due to weakness   60 degrees of knee flexion = able to perform without UE support     Balance  SL EO = 30 seconds on LLE  SL EC = 6 seconds on LLE       Single leg wall sit   13 seconds with the RLE off the ground        Treatment:  Therapeutic Exercise = 2 unit  ADDED TO HEP  Unilateral wall sit = 6x10\" holds   SL RDL isometric hold with EC = 1x6x15\" holds   REVIEW OF HEP   Unilateral glute bridge = 2x6 each side focusing on power        HEP: #5 (Access Code: [email or web address])  - Standing Terminal Knee Extension with Resistance  - 1 x daily - 7 x weekly - 2 sets - 20 " reps  - Forward Step Down  - 1 x daily - 4 x weekly - 3 sets - 5 reps  - Knee Extension / Flexion   - 1 x daily - 5-7 x weekly - 2 sets - 10 reps - 5-10 seconds hold  - Single Leg Bridge  - 1 x daily - 4 x weekly - 2 sets - 12 reps - 1-2 seconds hold  - Supine Hip Adduction Isometric with Ball  - 1 x daily - 4-7 x weekly - 2 sets - 30 seconds hold  - Controlled Knee      OP Education:   Discussion regarding progressing interventions independently at home between sessions when interventions become easier to perform           Diagnosis:  1. Lumbar radiculopathy    2. Nerve pain            Assessment:   Reassessment of objective measurements from the initial evaluation shows significant improvement in quadriceps strength and hip flexion on the involved side compared to the contralateral side. However, limitations persist in quadriceps strength, particularly with knee flexion angles approaching 90° and during single-leg chair ascent. Based on these findings, the patient was given an updated home exercise plan, including unilateral wall squats, single-leg hip hinges, isometric holds, and single-leg glute bridges. Despite notable progress in physical therapy, the patient remains limited in functional movements, such as stair navigation and transfers, due to ongoing lower extremity strength deficits and discomfort.        Plan:  Assess response to initial interventions in session and HEP   Improve closed and open chain quadriceps muscle activation   Nerve glides   Single leg strength and neuro re-education beginning in sitting and progressing to standing        Goals:  Patient will be independent with HEP. (Short-term)  MET    OUTCOME MEASURE:   Patient will improve in SHERIDAN outcome measure <35% disability in six weeks. (Short-term)  MET   Patient will improve in SHERIDAN outcome measure to <25% disability. (Long-term)  MET  Patient will improve in SHERIDAN outcome measure to <5% disability in 12 weeks. (Long-term)  75% met      FUNCTION:  Patient will demonstrate the ability to perform sit to stand transfers with the use of at least 50% of body weight to the involved lower extremity in six weeks (short-term).   60% MET   Patient will demonstrate the ability to perform a full sit to stand transfer with 100% weight-bearing on bilateral limbs in 12 weeks without pain. (Long-term)  50% MET     AMBULATION:  Patient will be able to ambulate 50 feet without the use of a single point mancilla and less than a  3/10 pain scale in six weeks (short-term).   MET   Patient will be able to ambulate 150 feet without the use of a single point cane and no pain in 12 weeks. (Long-term)  85% MET   Patient will demonstrate symmetrical strength in lower extremity myotome screening in 12 weeks. (Long-term)  85% MET             Brayan Strong, PT

## 2025-05-28 ENCOUNTER — TREATMENT (OUTPATIENT)
Dept: PHYSICAL THERAPY | Facility: CLINIC | Age: 75
End: 2025-05-28
Payer: MEDICARE

## 2025-05-28 DIAGNOSIS — M54.16 LUMBAR RADICULOPATHY: ICD-10-CM

## 2025-05-28 DIAGNOSIS — M79.2 NERVE PAIN: ICD-10-CM

## 2025-05-28 PROCEDURE — 97112 NEUROMUSCULAR REEDUCATION: CPT | Mod: GP

## 2025-05-28 NOTE — PROGRESS NOTES
"Physical Therapy    Physical Therapy Re-evaluation & Treatment    Patient Name: Blaise Pope  MRN: [id number]  Today's Date: 5/28/2025  Time Calculation  Start Time: 1020  Stop Time: 1104  Time Calculation (min): 44 min        Insurance:  Visit: #14  Authorized: 20  Certification:  2/17/2025 - 2/17/2026   Payor: MEDICARE / Plan: MEDICARE PART A AND B / Product Type: *No Product type* /       Subjective:  The patient presents to physical therapy with continued improvement in left anterior knee symptoms at rest and with ambulation. Denies any falls or stumbles since the last session. Notes that the stiffness in the knee has improved since the previous session, initially noticing this sensation after the previous session when he was going up and down the stairs/steps leaving the clinic. Overall, he remains satisfied with his progress being able to perform dynamic functional movement patterns such as stair navigation and prolonged community ambulation with reductions in falls and knee buckling.       Current pain:   0/10 sensory pain to the left anterior thigh   2/10 to medial knee pain         Objective:  Unilateral SL squat (# of times in 15 seconds)   1st trial = 12   2 isabella   2nd trial = 11   1 buckle         Treatment:  NMR  = 3 unit   Unilateral wall sit = 6x10\" holds   Knee to wall   Staggered stance EO = 5x  Single leg EO = 5x  Single leg EC = 5x   Staggered stance with foot on airex foam pad   Reaching - alternating between same side and opposite arm reach for therapist hand = 60 consecutive seconds   Reaching with math questions = 2x60\" consecutively  Review of previous Hep   Single leg sit to stand = 4x5  Patient education   Performance of current HEP including dosage and parameters   Alteration to current POC and reduction of PT visits monthly to determine patient is able to begin independently managing his symptoms - reducing 1x/week to 1x every 2-3 weeks      HEP: #5 (Access Code: [email or web address])  - " Unilateral Wall Sit  - 1 x daily - 4-5 x weekly - 1 sets - 6 reps - 10 seconds hold  - Single Leg Hip Hinge - Balance  - 1 x daily - 4-5 x weekly - 1 sets - 6 reps - 15 seconds hold  - Single Leg Bridge  - 1 x daily - 4-5 x weekly - 2 sets - 6 reps - 3 seconds hold      OP Education:   Discussion regarding progressing interventions independently at home between sessions when interventions become easier to perform           Diagnosis:  1. Lumbar radiculopathy    2. Nerve pain            Assessment:   Patient continues to respond well to interventions in session, working to improve closed chain quadriceps strength and tolerance into and out of knee flexion positions. He is introduced in session to various single leg interventions on even and uneven surfaces challenging closed chain neuromuscular re-education of the quadriceps required for functional movement patterns. Also incorporated was cognitive challenges while performing single leg movement patterns, noticing large reductions in performance and ability to maintain position due to muscle fatigue and cognitive challenge. Discussion with patient was centered around the need to continue performing current HEP and considering reductions in monthly frequency to determine if patient is able to self-manage and further improve symptoms that he has been obtaining with physical therapy. Patient will continue to benefit from physical therapy services in order to maximize functional outcomes.          Plan:  Assess response to initial interventions in session and HEP   Improve closed and open chain quadriceps muscle activation   Nerve glides   Single leg strength and neuro re-education beginning in sitting and progressing to standing        Goals:  Patient will be independent with HEP. (Short-term)  MET    OUTCOME MEASURE:   Patient will improve in SHERIDAN outcome measure <35% disability in six weeks. (Short-term)  MET   Patient will improve in SHERIDAN outcome measure to <25%  disability. (Long-term)  MET  Patient will improve in SHERIDAN outcome measure to <5% disability in 12 weeks. (Long-term)  75% met     FUNCTION:  Patient will demonstrate the ability to perform sit to stand transfers with the use of at least 50% of body weight to the involved lower extremity in six weeks (short-term).   70% MET   Patient will demonstrate the ability to perform a full sit to stand transfer with 100% weight-bearing on bilateral limbs in 12 weeks without pain. (Long-term)  60% MET     AMBULATION:  Patient will be able to ambulate 50 feet without the use of a single point mancilla and less than a  3/10 pain scale in six weeks (short-term).   MET   Patient will be able to ambulate 150 feet without the use of a single point cane and no pain in 12 weeks. (Long-term)  90% MET   Patient will demonstrate symmetrical strength in lower extremity myotome screening in 12 weeks. (Long-term)  80% MET             Brayan Strong, PT

## 2025-06-11 ENCOUNTER — APPOINTMENT (OUTPATIENT)
Dept: PHYSICAL THERAPY | Facility: CLINIC | Age: 75
End: 2025-06-11
Payer: MEDICARE

## 2025-06-18 ENCOUNTER — TREATMENT (OUTPATIENT)
Dept: PHYSICAL THERAPY | Facility: CLINIC | Age: 75
End: 2025-06-18
Payer: MEDICARE

## 2025-06-18 DIAGNOSIS — M79.2 NERVE PAIN: ICD-10-CM

## 2025-06-18 DIAGNOSIS — M54.16 LUMBAR RADICULOPATHY: ICD-10-CM

## 2025-06-18 PROCEDURE — 97110 THERAPEUTIC EXERCISES: CPT | Mod: GP

## 2025-06-18 NOTE — PROGRESS NOTES
"Physical Therapy    Physical Therapy Treatment    Patient Name: Blaise Pope  MRN: [id number]  Today's Date: 6/18/2025  Time Calculation  Start Time: 0802  Stop Time: 0850  Time Calculation (min): 48 min        Insurance:  Visit: #15  Authorized: 20  Certification:  2/17/2025 - 2/17/2026   Payor: MEDICARE / Plan: MEDICARE PART A AND B / Product Type: *No Product type* /       Subjective:  The patient presents to physical therapy without buckling episodes in the last week, only reporting 2 total in the last month which is a major improvement from previously. He continues to experience knee pain on the inside that is most noticeable when laying flat in bed or rolling/turning over. Walking has become more confident without consideration for an assistive device currently or in the future as a result of the positive progression he has made with physical therapy.     He recently injured his right shoulder which has limited his ability to perform certain exercises as part of HEP, noting that he considered going to the ER to have it evaluated.       Current pain:   0/10 sensory pain to the left anterior thigh   2/10 to medial knee pain         Objective:  (+) rubén test     Knee Flexion ROM =  R = 120 A  L = 125 A    Foot adduction   L = 40 A  Reproduction of medial knee pain at end-range   R = 40 A    Foot abduction   L = 40 A  R = 40 A      TTP   MCL superiorly at medial femoral condyle   MCL near medial joint line       Special tests   (+) valgus stress test at 30 degrees of knee flexion         Treatment:  Therapeutic Exercise = 2 unit   Upright pedal bike = 5 minutes (level 1)   Added to HEP   Rubén test position hip flexor stretch = 3x30\" holds   Seated foot adduction = 2x20 (blue theraband)   Double leg quarter wall sit = 2x60\" holds   Performed but not part of HEP   Single leg seated leg curl with foot adduction / ankle inversion = 3x10 (15#)   Patient education   Performance of current HEP including dosage and " parameters   Alteration to current POC and reduction of PT visits monthly to determine patient is able to begin independently managing his symptoms - reducing 1x/week to 1x every 2-3 weeks        HEP: #6 (Access Code: [email or web address])  - Seated Ankle Inversion with Anchored Resistance  - 1 x daily - 7 x weekly - 2 sets - 20-25 reps  - Wall Quarter Sit  - 1 x daily - 4-5 x weekly - 2-3 sets - 60 seconds hold  - Single Leg Hip Hinge - Balance  - 1 x daily - 4-5 x weekly - 1 sets - 6 reps - 15 seconds hold  - Single Leg Bridge  - 1 x daily - 4-5 x weekly - 2 sets - 6 reps - 3 seconds hold  - Supine Quadriceps Stretch with Strap on Table  - 1 x daily - 7 x weekly - 3 sets - 60 seconds hold      OP Education:   Discussion regarding progressing interventions independently at home between sessions when interventions become easier to perform           Diagnosis:  1. Lumbar radiculopathy    2. Nerve pain            Assessment:   Reassessment of medial knee pain demonstrates that involvement of the MCL could be a possibility as a source of patient's pain, able to be reproduced with palpation, valgus stress test at 30 degrees of knee flexion, and pain with end-range ankle inversion / foot adduction. After introduction of seated ankle inversion / foot adduction, patient experiences a noticeable reduction in medial knee pain with palpation and upright ambulation on level surface. This movement is added to patient's HEP as well as double leg wall sit rather than unilateral from patient's most recent aggravation of the right shoulder during a family gathering. This movement continues to be challenging to the quadriceps with prolonged contractions, but not reproducing pain.     Despite patient's 95% reported improvement in symptoms, he remains limited in his functional tolerance to prolonged ambulation and stair navigation. Patient will continue to benefit from physical therapy services in order to maximize functional outcomes.         Plan:  Assess response to initial interventions in session and HEP   Improve closed and open chain quadriceps muscle activation   Nerve glides   Single leg strength and neuro re-education beginning in sitting and progressing to standing        Goals:  Patient will be independent with HEP. (Short-term)  MET    OUTCOME MEASURE:   Patient will improve in SHERIDAN outcome measure <35% disability in six weeks. (Short-term)  MET   Patient will improve in SHERIDAN outcome measure to <25% disability. (Long-term)  MET  Patient will improve in SHERIDAN outcome measure to <5% disability in 12 weeks. (Long-term)  75% met     FUNCTION:  Patient will demonstrate the ability to perform sit to stand transfers with the use of at least 50% of body weight to the involved lower extremity in six weeks (short-term).   75% MET   Patient will demonstrate the ability to perform a full sit to stand transfer with 100% weight-bearing on bilateral limbs in 12 weeks without pain. (Long-term)  65% MET     AMBULATION:  Patient will be able to ambulate 50 feet without the use of a single point mancilla and less than a  3/10 pain scale in six weeks (short-term).   MET   Patient will be able to ambulate 150 feet without the use of a single point cane and no pain in 12 weeks. (Long-term)  90% MET   Patient will demonstrate symmetrical strength in lower extremity myotome screening in 12 weeks. (Long-term)  80% MET             Bryaan Strong, PT

## 2025-06-26 ENCOUNTER — APPOINTMENT (OUTPATIENT)
Dept: PHYSICAL THERAPY | Facility: CLINIC | Age: 75
End: 2025-06-26
Payer: MEDICARE

## 2025-07-03 ENCOUNTER — TREATMENT (OUTPATIENT)
Dept: PHYSICAL THERAPY | Facility: CLINIC | Age: 75
End: 2025-07-03
Payer: MEDICARE

## 2025-07-03 DIAGNOSIS — M79.2 NERVE PAIN: ICD-10-CM

## 2025-07-03 DIAGNOSIS — M54.16 LUMBAR RADICULOPATHY: ICD-10-CM

## 2025-07-03 PROCEDURE — 97112 NEUROMUSCULAR REEDUCATION: CPT | Mod: GP

## 2025-07-03 NOTE — PROGRESS NOTES
"Physical Therapy    Physical Therapy Treatment    Patient Name: Blaise Pope  MRN: [id number]  Today's Date: 7/3/2025  Time Calculation  Start Time: 1040  Stop Time: 1128  Time Calculation (min): 48 min        Insurance:  Visit: #16  Authorized: 20  Certification:  2/17/2025 - 2/17/2026   Payor: MEDICARE / Plan: MEDICARE PART A AND B / Product Type: *No Product type* /       Subjective:  The patient presents to physical therapy continuing to note near elimination of stiffness into the knee over the last few weeks, \"feels as though it gets better every week\". Reports knee buckling 2x over the last week while walking which could have been the result of increasing speed of movement without consideration for safety due to concerns of wife's health, denies falling down during buckling episode. Remains compliant daily to current HEP which has further improved confidence and abilities to ambulate and perform stair navigation.          Current pain:   2/10 to medial knee pain         Objective:  Knee flexion AROM  L = WNL with soft end-feel     Lateral step up   Evidence of 2x knee buckling episodes without falling down (maintains upright position with bilateral UE support)   Able to properly perform when cued to maintain knee flexion posture         Treatment:  NMR = 2 unit   TKE with slant board squat = 3x15   3 second ECC with fast CONC  Added to HEP   Lateral step up with airex foam pad = 3x15  Curtsy step up with airex foam pad = 3x15   Kickstand RDL = 2x12     Therapeutic Exercise = 1 unit   Review of HEP   Ankle inversion (seated) = x25 (blue theraband)   Ankle inversion (supine) = x25 (blue theraband)   Patient education   Performance of current HEP including dosage and parameters   Nerve regeneration and motor control with quadriceps loading (increased knee flexion postures)   Difference between hinging pattern and a squat pattern         HEP: #7 (Access Code: [email or web address])  - Lateral Step Up  - 1 x daily - 4-6 x " weekly - 3 sets - 15 reps  - Single Leg Hip Hinge - Balance  - 1 x daily - 4-6 x weekly - 2 sets - 12 reps  - Seated Ankle Inversion with Anchored Resistance  - 1 x daily - 7 x weekly - 2 sets - 20-25 reps  - Wall Quarter Sit  - 1 x daily - 4-5 x weekly - 2-3 sets - 60 seconds hold  - Single Leg Bridge  - 1 x daily - 4-5 x weekly - 2 sets - 6 reps - 3 seconds hold  - Supine Quadriceps Stretch with Strap on Table  - 1 x daily - 7 x weekly - 3 sets - 60 seconds hold      OP Education:   Discussion regarding progressing interventions independently at home between sessions when interventions become easier to perform           Diagnosis:  1. Lumbar radiculopathy    2. Nerve pain            Assessment:   Patient continues to respond favorably to physical therapy interventions targeting quadriceps neuromuscular re-education in predominantly closed chain positions, as buckling of involved knee continues to occur. However, subjective improvements in buckling occurrence coincide with mechanics in clinic when performing side stepping and curtsy stepping onto short box. Evidence of buckling occurred several times in session but patient able to remain upright as a result of emphasizing bilateral UE support.     Despite patient's 95% reported improvement in symptoms, he remains limited in his functional tolerance to prolonged ambulation and stair navigation as a result of reduced quadriceps weakness and subsequent knee buckling. Patient will continue to benefit from physical therapy services in order to maximize functional outcomes.        Plan:  Assess response to initial interventions in session and HEP   Improve closed and open chain quadriceps muscle activation   Nerve glides   Single leg strength and neuro re-education beginning in sitting and progressing to standing        Goals:  Patient will be independent with HEP. (Short-term)  MET    OUTCOME MEASURE:   Patient will improve in SHERIDAN outcome measure to <5% disability in  12 weeks. (Long-term)  75% met     FUNCTION:  Patient will demonstrate the ability to perform sit to stand transfers with the use of at least 50% of body weight to the involved lower extremity in six weeks (short-term).   MET   Patient will demonstrate the ability to perform a full sit to stand transfer with 100% weight-bearing on bilateral limbs in 12 weeks without pain. (Long-term)  75% MET   Patient will demonstrate the ability to perform a step up/down on a 3-inch box without the occurrence of knee buckling, maintaining bilateral UE support mimicking home and community stair navigation safely.   40% met     AMBULATION:   Patient will demonstrate symmetrical strength in lower extremity myotome screening in 12 weeks. (Long-term)  80% MET             Brayan Strong, PT

## 2025-07-10 ENCOUNTER — TREATMENT (OUTPATIENT)
Dept: PHYSICAL THERAPY | Facility: CLINIC | Age: 75
End: 2025-07-10
Payer: MEDICARE

## 2025-07-10 DIAGNOSIS — M79.2 NERVE PAIN: ICD-10-CM

## 2025-07-10 DIAGNOSIS — M54.16 LUMBAR RADICULOPATHY: ICD-10-CM

## 2025-07-10 PROCEDURE — 97110 THERAPEUTIC EXERCISES: CPT | Mod: GP

## 2025-07-10 PROCEDURE — 97140 MANUAL THERAPY 1/> REGIONS: CPT | Mod: GP

## 2025-07-10 NOTE — PROGRESS NOTES
Physical Therapy    Physical Therapy Treatment    Patient Name: Blaise Pope  MRN: [id number]  Today's Date: 7/10/2025  Time Calculation  Start Time: 1023  Stop Time: 1103  Time Calculation (min): 40 min        Insurance:  Visit: #17  Authorized: 20  Certification:  2/17/2025 - 2/17/2026   Payor: MEDICARE / Plan: MEDICARE PART A AND B / Product Type: *No Product type* /       Subjective:  The patient presents to physical therapy with worsening of anterior thigh pain after an incident with young family member resulting in running around the yard. Symptoms returned back to baseline after ~3 days, but continues to note slight soreness coming in today's session without additional knee joint pain.         Pain:   4-5/10 when initial soreness pain began   2/10 as he is walking into clinic         Objective:  Knee flexion ROM  L = 135 P with pain at available end-range   Improves with inferior patellar mobilizations     No noticeable gapping in the rectus femoris or VL musculature       TTP  VL, VMO, Gracilis, rectus femoris        Treatment:  Manual therapy = 1 units   Patella   Inferior mobilizations with knee bent to 60 degrees   STM /IASTM  VL, VMO, Gracilis with leg in rubén test position      Therapeutic Exercise = 2 unit   Rubén test hip flexor stretch = 2x10 diaphragmatic breathes  Slant board calf stretch = 2x10 diaphragmatic breathes   Single leg hamstring stretch = 2x10 diaphragmatic breathes  Ellyptical = 3 minutes    Patient education   Performance of current HEP including dosage and parameters   Nerve regeneration and motor control with quadriceps loading (increased knee flexion postures)   Difference between muscle strain and tear.         HEP: #7 (Access Code: [email or web address])  - Lateral Step Up  - 1 x daily - 4-6 x weekly - 3 sets - 15 reps  - Single Leg Hip Hinge - Balance  - 1 x daily - 4-6 x weekly - 2 sets - 12 reps  - Seated Ankle Inversion with Anchored Resistance  - 1 x daily - 7 x weekly - 2 sets -  20-25 reps  - Wall Quarter Sit  - 1 x daily - 4-5 x weekly - 2-3 sets - 60 seconds hold  - Single Leg Bridge  - 1 x daily - 4-5 x weekly - 2 sets - 6 reps - 3 seconds hold  - Supine Quadriceps Stretch with Strap on Table  - 1 x daily - 7 x weekly - 3 sets - 60 seconds hold      OP Education:   Discussion regarding progressing interventions independently at home between sessions when interventions become easier to perform           Diagnosis:  1. Lumbar radiculopathy    2. Nerve pain            Assessment:   Reduction in intensity and muscle activation in session due to recent worsening of quadriceps pain in days prior. No concern at this time for muscle or joint damage, minimally limited in full AROM into knee flexion that improves with inferior patellar mobilizations. Flexibility-based interventions to muscles surrounding the knee aid in reducing overall tissue tension and knee compression sensations by sessions end.     Despite patient's 95% reported improvement in symptoms, he remains limited in his functional tolerance to prolonged ambulation and stair navigation as a result of reduced quadriceps weakness and subsequent knee buckling. Patient will continue to benefit from physical therapy services in order to maximize functional outcomes.        Plan:  Assess response to initial interventions in session and HEP   Improve closed and open chain quadriceps muscle activation   Nerve glides   Single leg strength and neuro re-education beginning in sitting and progressing to standing        Goals:  Patient will be independent with HEP. (Short-term)  MET    OUTCOME MEASURE:   Patient will improve in SHERIDAN outcome measure to <5% disability in 12 weeks. (Long-term)  75% met     FUNCTION:  Patient will demonstrate the ability to perform sit to stand transfers with the use of at least 50% of body weight to the involved lower extremity in six weeks (short-term).   MET   Patient will demonstrate the ability to perform a full  sit to stand transfer with 100% weight-bearing on bilateral limbs in 12 weeks without pain. (Long-term)  75% MET   Patient will demonstrate the ability to perform a step up/down on a 3-inch box without the occurrence of knee buckling, maintaining bilateral UE support mimicking home and community stair navigation safely.   40% met     AMBULATION:   Patient will demonstrate symmetrical strength in lower extremity myotome screening in 12 weeks. (Long-term)  80% MET             Brayan Strong, PT

## 2025-07-17 ENCOUNTER — TREATMENT (OUTPATIENT)
Dept: PHYSICAL THERAPY | Facility: CLINIC | Age: 75
End: 2025-07-17
Payer: MEDICARE

## 2025-07-17 DIAGNOSIS — M79.2 NERVE PAIN: ICD-10-CM

## 2025-07-17 DIAGNOSIS — M54.16 LUMBAR RADICULOPATHY: Primary | ICD-10-CM

## 2025-07-17 PROCEDURE — 97110 THERAPEUTIC EXERCISES: CPT | Mod: GP

## 2025-07-17 NOTE — PROGRESS NOTES
"Physical Therapy    Physical Therapy Treatment    Patient Name: Blaise Pope  MRN: [id number]  Today's Date: 7/17/2025  Time Calculation  Start Time: 0936  Stop Time: 1017  Time Calculation (min): 41 min        Insurance:  Visit: #17  Authorized: 20  Certification:  2/17/2025 - 2/17/2026   Payor: MEDICARE / Plan: MEDICARE PART A AND B / Product Type: *No Product type* /       Subjective:  The patient presents to physical therapy with continued tightness to the left knee experienced with upright ambulation. Denies any falls since the previous session, noting a few instances of knee buckling but the ability to stop the knee from a full buckle eventually leading to a fall. This has been a large improvement since beginning physical therapy whereas before he would fall each time after the knee buckling begins.         Pain:   1/10 as he is walking into clinic         Objective:  Compensatory knee extension during lateral         Treatment:  Therapeutic Exercise = 3 unit   Ellyptical = 4 minutes   Ecc SL Knee extension   Warm up = 5x (10#)   Working sets = 5x5 (20#)   Added to HEP   Lateral side stepping (GTB around ankles) = 3x30\"   Palloff press (blue theraband) = x10 only with left foot forward   Review of HEP   SL RDL = 2x12   Added use of chair to block the knee from further flexion  Patient education   Updated HEP - dosage and parameters         HEP: #7 (Access Code: [email or web address])  - Side Band Walk (Ankles)  - 1 x daily - 4 x weekly - 4 sets - 30 seconds hold  - Single Leg Hip Hinge - Balance  - 1 x daily - 4 x weekly - 2 sets - 12 reps  - Lunge Position (not kneeling) Palloff Press   - 1 x daily - 4 x weekly - 2 sets - 10 reps  - Seated Ankle Inversion with Anchored Resistance  - 1 x daily - 7 x weekly - 2 sets - 20-25 reps  - Wall Quarter Sit  - 1 x daily - 4-5 x weekly - 2-3 sets - 60 seconds hold  - Supine Quadriceps Stretch with Strap on Table  - 1 x daily - 7 x weekly - 3 sets - 60 seconds hold      OP " Education:   Discussion regarding progressing interventions independently at home between sessions when interventions become easier to perform           Diagnosis:  1. Lumbar radiculopathy    2. Nerve pain              Assessment:   Patient tolerates introduction of additional kinetic chain activities including banded lateral walks, maintaining knee flexion positions throughout which is evidently challenging with greater sets. Alterations were made to RDL by adding in a chair to block additional knee flexion, which aids in helping patient learn how to properly hinge and coordinate proximal hip muscles with knee stabilizers.     Despite patient's 95% reported improvement in symptoms, he remains limited in his functional tolerance to prolonged ambulation and stair navigation as a result of reduced quadriceps weakness and subsequent knee buckling. Patient will continue to benefit from physical therapy services in order to maximize functional outcomes.        Plan:  Assess response to initial interventions in session and HEP   Improve closed and open chain quadriceps muscle activation   Nerve glides   Single leg strength and neuro re-education beginning in sitting and progressing to standing        Goals:  Patient will be independent with HEP. (Short-term)  MET    OUTCOME MEASURE:   Patient will improve in SHERIDAN outcome measure to <5% disability in 12 weeks. (Long-term)  75% met     FUNCTION:  Patient will demonstrate the ability to perform a full sit to stand transfer with 100% weight-bearing on bilateral limbs in 12 weeks without pain. (Long-term)  75% MET   Patient will demonstrate the ability to perform a step up/down on a 3-inch box without the occurrence of knee buckling, maintaining bilateral UE support mimicking home and community stair navigation safely.   40% met     AMBULATION:   Patient will demonstrate symmetrical strength in lower extremity myotome screening in 12 weeks. (Long-term)  80% MET              Brayan Strong, PT

## 2025-07-24 ENCOUNTER — TREATMENT (OUTPATIENT)
Dept: PHYSICAL THERAPY | Facility: CLINIC | Age: 75
End: 2025-07-24
Payer: MEDICARE

## 2025-07-24 DIAGNOSIS — M79.2 NERVE PAIN: ICD-10-CM

## 2025-07-24 DIAGNOSIS — M54.16 LUMBAR RADICULOPATHY: ICD-10-CM

## 2025-07-24 PROCEDURE — 97110 THERAPEUTIC EXERCISES: CPT | Mod: GP

## 2025-07-24 PROCEDURE — 97112 NEUROMUSCULAR REEDUCATION: CPT | Mod: GP

## 2025-07-24 NOTE — PROGRESS NOTES
Physical Therapy    Physical Therapy Treatment    Patient Name: Blaise Pope  MRN: [id number]  Today's Date: 7/24/2025  Time Calculation  Start Time: 1020  Stop Time: 1100  Time Calculation (min): 40 min        Insurance:  Visit: #19  Authorized: 20  Certification:  2/17/2025 - 2/17/2026   Payor: MEDICARE / Plan: MEDICARE PART A AND B / Product Type: *No Product type* /       Subjective:  The patient presents to physical therapy with continued improvements in knee function, being able to walk up and down the stairs at home without the presence of knee buckling. He also notes that the amount of stiffness in the knee has significantly improved, without feeling compression surrounding the entire knee that he would previously         Pain:   1/10 as he is walking into clinic         Objective:        Treatment:  Therapeutic Exercise = 1 unit   Upright pedal bike = 6 minutes level 11   Patient education   Performance of interventions at home to avoid knee hyperextension    NMR = 2 units  NMES (no on/off, rate 65)   Pad placement  VMO + VL (level 26)   Rectus femoris (level 26)   Exercises   SL leg press = 3x8 (50#)   SL lateral step up/down (3-inch step) = 3x12   SL reverse step down (3-inch step) = 2x12         HEP: #7 (Access Code: [email or web address])  - Side Band Walk (Ankles)  - 1 x daily - 4 x weekly - 4 sets - 30 seconds hold  - Single Leg Hip Hinge - Balance  - 1 x daily - 4 x weekly - 2 sets - 12 reps  - Lunge Position (not kneeling) Palloff Press   - 1 x daily - 4 x weekly - 2 sets - 10 reps  - Seated Ankle Inversion with Anchored Resistance  - 1 x daily - 7 x weekly - 2 sets - 20-25 reps  - Wall Quarter Sit  - 1 x daily - 4-5 x weekly - 2-3 sets - 60 seconds hold  - Supine Quadriceps Stretch with Strap on Table  - 1 x daily - 7 x weekly - 3 sets - 60 seconds hold      OP Education:   Discussion regarding progressing interventions independently at home between sessions when interventions become easier to perform            Diagnosis:  1. Lumbar radiculopathy    2. Nerve pain              Assessment:   NMES utilized in session with four pads placed throughout the quadriceps muscle group, combined in conjunction with closed chain movements to continue improving patients tolerance with dynamic movements previously creating knee buckling episodes. Frequent verbal and tactile cueing required throughout to obtain hip hinging pattern and reduce subsequent increased stress and strain to the tibiofemoral joint with increased knee flexion angles.     Despite patient's 95% reported improvement in symptoms, he remains limited in his functional tolerance to prolonged ambulation and stair navigation as a result of reduced quadriceps weakness and subsequent knee buckling. Patient will continue to benefit from physical therapy services in order to maximize functional outcomes.        Plan:  Assess response to initial interventions in session and HEP   Improve closed and open chain quadriceps muscle activation   Nerve glides   Single leg strength and neuro re-education beginning in sitting and progressing to standing        Goals:  Patient will be independent with HEP. (Short-term)  MET    OUTCOME MEASURE:   Patient will improve in SHERIDAN outcome measure to <5% disability in 12 weeks. (Long-term)  75% met     FUNCTION:  Patient will demonstrate the ability to perform a full sit to stand transfer with 100% weight-bearing on bilateral limbs in 12 weeks without pain. (Long-term)  75% MET   Patient will demonstrate the ability to perform a step up/down on a 3-inch box without the occurrence of knee buckling, maintaining bilateral UE support mimicking home and community stair navigation safely.   70% met     AMBULATION:   Patient will demonstrate symmetrical strength in lower extremity myotome screening in 12 weeks. (Long-term)  80% MET             Brayan Strong, PT

## 2025-07-31 ENCOUNTER — TREATMENT (OUTPATIENT)
Dept: PHYSICAL THERAPY | Facility: CLINIC | Age: 75
End: 2025-07-31
Payer: MEDICARE

## 2025-07-31 DIAGNOSIS — M79.2 NERVE PAIN: ICD-10-CM

## 2025-07-31 DIAGNOSIS — M54.16 LUMBAR RADICULOPATHY: Primary | ICD-10-CM

## 2025-07-31 PROCEDURE — 97164 PT RE-EVAL EST PLAN CARE: CPT | Mod: GP

## 2025-07-31 PROCEDURE — 97110 THERAPEUTIC EXERCISES: CPT | Mod: GP

## 2025-07-31 NOTE — PROGRESS NOTES
Physical Therapy    Physical Therapy Treatment    Patient Name: Blaise Pope  MRN: [id number]  Today's Date: 8/3/2025  Time Calculation  Start Time: 1021  Stop Time: 1102  Time Calculation (min): 41 min        Insurance:  Visit: #20  Authorized: 20  Certification:  2/17/2025 - 2/17/2026   Payor: MEDICARE / Plan: MEDICARE PART A AND B / Product Type: *No Product type* /       Subjective:  The patient presents to with continued improvement in left knee tightness (95% better) and left leg strength as it relates to walking up and down the stairs reciprocally, remaining careful to avoid potential for falls.     No falls since the last session. Admits to one buckling episode while walking at a quicker pace leading to a slight buckle before he was able to quickly catch himself and avoid falling.           Pain:   1/10 as he is walking into clinic         Objective:  Strength HDD   Hip flexion   L = 31.0#, 31.9#  R = 33.0#, 35#  Knee extension   L = 64.2#  R = 63.5#    Myotomes  L4 = 5/5 bilat  S1 = 5/5 bilat   S2 = 5/5 bilat     SL squat  Able to perform   25 inch, 22.5 inches, 21.5 inches   Unable to perform   17 inches (chair height), 19.5 inches     SL Step up / down   Able to perform bilaterally on both a 3 inch and 6 inch step without evidence of knee valgus           Treatment:  Therapeutic Exercise = 1 unit   Upright pedal bike = 5 minutes level 10  Patient education   Performance of interventions at home to maintain continued positive progression beyond physical therapy care        HEP: #7 (Access Code: [email or web address])  - Side Band Walk (Ankles)  - 1 x daily - 4 x weekly - 4 sets - 30 seconds hold  - Single Leg Hip Hinge - Balance  - 1 x daily - 4 x weekly - 2 sets - 12 reps  - Lunge Position (not kneeling) Palloff Press   - 1 x daily - 4 x weekly - 2 sets - 10 reps  - Seated Ankle Inversion with Anchored Resistance  - 1 x daily - 7 x weekly - 2 sets - 20-25 reps  - Wall Quarter Sit  - 1 x daily - 4-5 x weekly - 2-3  sets - 60 seconds hold  - Supine Quadriceps Stretch with Strap on Table  - 1 x daily - 7 x weekly - 3 sets - 60 seconds hold      OP Education:   Discussion regarding progressing interventions independently at home between sessions when interventions become easier to perform           Diagnosis:  1. Lumbar radiculopathy    2. Nerve pain                Assessment:   Reassessment of key objective measures indicates near-symmetrical knee extension and hip flexion strength, confirmed via handheld dynamometry and bilateral retesting. Patient continues to exhibit compensatory movement patterns during sit-to-stand and squat tasks, favoring the contralateral limb. Single-leg squat performance remains limited at standard chair height but is achievable at 19.5 inches. Stair navigation has improved, with no observable dynamic knee valgus or buckling at 3- and 6-inch step heights. Since initiating therapy, the patient has demonstrated significant gains in functional tolerance, pain reduction, and decreased sensations of femoral joint stiffness during upright activity. The patient will be discharged and advised to continue the home exercise program to maintain current functional gains.      Plan:  Assess response to initial interventions in session and HEP   Improve closed and open chain quadriceps muscle activation   Nerve glides   Single leg strength and neuro re-education beginning in sitting and progressing to standing        Goals:  Patient will be independent with HEP. (Short-term)  MET    OUTCOME MEASURE:   Patient will improve in SHERIDAN outcome measure to <5% disability in 12 weeks. (Long-term)  MET    FUNCTION:  Patient will demonstrate the ability to perform a full sit to stand transfer with 100% weight-bearing on bilateral limbs in 12 weeks without pain. (Long-term)  75% MET   Patient will demonstrate the ability to perform a step up/down on a 3-inch box without the occurrence of knee buckling, maintaining bilateral UE  support mimicking home and community stair navigation safely.   MET   Patient will demonstrate the ability to perform a step up/down on a 6-inch box without the occurrence of knee buckling, maintaining bilateral UE support mimicking home and community stair navigation safely.   MET     AMBULATION:   Patient will demonstrate symmetrical strength in lower extremity myotome screening in 12 weeks. (Long-term)  MET             Brayan Strong, PT